# Patient Record
Sex: MALE | Race: WHITE | NOT HISPANIC OR LATINO | Employment: OTHER | URBAN - NONMETROPOLITAN AREA
[De-identification: names, ages, dates, MRNs, and addresses within clinical notes are randomized per-mention and may not be internally consistent; named-entity substitution may affect disease eponyms.]

---

## 2017-01-04 RX ORDER — CLONAZEPAM 0.5 MG/1
TABLET ORAL
Qty: 90 TABLET | Refills: 0
Start: 2017-01-04 | End: 2017-02-06 | Stop reason: SDUPTHER

## 2017-02-06 RX ORDER — CLONAZEPAM 0.5 MG/1
TABLET ORAL
Qty: 90 TABLET | Refills: 0
Start: 2017-02-06 | End: 2017-03-06 | Stop reason: SDUPTHER

## 2017-02-06 RX ORDER — RISPERIDONE 1 MG/1
TABLET ORAL
Qty: 30 TABLET | Refills: 2 | Status: SHIPPED | OUTPATIENT
Start: 2017-02-06 | End: 2017-05-09 | Stop reason: SDUPTHER

## 2017-02-06 RX ORDER — MIRTAZAPINE 30 MG/1
30 TABLET, FILM COATED ORAL NIGHTLY
Qty: 30 TABLET | Refills: 2 | Status: SHIPPED | OUTPATIENT
Start: 2017-02-06 | End: 2017-04-17 | Stop reason: SDUPTHER

## 2017-02-06 RX ORDER — DEXTROMETHORPHAN HYDROBROMIDE AND QUINIDINE SULFATE 20; 10 MG/1; MG/1
1 CAPSULE, GELATIN COATED ORAL EVERY 12 HOURS
Qty: 60 CAPSULE | Refills: 2 | Status: SHIPPED | OUTPATIENT
Start: 2017-02-06 | End: 2017-05-09 | Stop reason: SDUPTHER

## 2017-02-07 ENCOUNTER — OFFICE VISIT (OUTPATIENT)
Dept: PSYCHIATRY | Facility: CLINIC | Age: 37
End: 2017-02-07

## 2017-02-07 VITALS
HEIGHT: 67 IN | WEIGHT: 176 LBS | BODY MASS INDEX: 27.62 KG/M2 | HEART RATE: 69 BPM | SYSTOLIC BLOOD PRESSURE: 110 MMHG | DIASTOLIC BLOOD PRESSURE: 79 MMHG

## 2017-02-07 DIAGNOSIS — F48.2 PBA (PSEUDOBULBAR AFFECT): Primary | ICD-10-CM

## 2017-02-07 DIAGNOSIS — Z87.828 HISTORY OF TRAUMATIC HEAD INJURY: ICD-10-CM

## 2017-02-07 PROCEDURE — 99213 OFFICE O/P EST LOW 20 MIN: CPT | Performed by: NURSE PRACTITIONER

## 2017-02-07 RX ORDER — CETIRIZINE HYDROCHLORIDE 10 MG/1
10 TABLET ORAL DAILY
COMMUNITY
End: 2017-08-08 | Stop reason: SDUPTHER

## 2017-02-07 NOTE — PROGRESS NOTES
This provider is located at [facility], Behavioral Health, [address], [address] [place] [facility]"" in [facility].    The Patient  is seen remotely at [facility], [facility], [address] [place] [address], using [employer], an encrypted service from one [facility] facility to another,  without staff present.    The patient’s condition being diagnosed/treated is appropriate for telemedicine. The provider identified him/herself as well as his or her credentials.     The patient and/or patients guardian consent to be seen remotely, and when consent is given they understand that the consent allows for patient identifiable information to be sent to a third party as needed.   They may refuse to be seen remotely at any time.  The electronic data is encrypted and password protected, and the patient has been advised of the potential risks to privacy notwithstanding such measures.        Subjective   Murali Coyle is a 36 y.o. male who is here today for medication management follow up.    Chief Complaint: Diagnoses and all orders for this visit:    PBA (pseudobulbar affect)    History of traumatic head injury      History of Present Illness Staff member accompanies patient to appt. He reports no issues with patient. He is taking his medications, he is eating well, sleeping well. He stares at the TV, no aggression, no crying or laughing inappropriately while on his medications. Patient stares at Monitor screen making no effort to communicate until I asked him several times if he could sit up and then he yelled loudly for 2 seconds and it was over. When the staff member said ok lets go he got up and left. He held his mouth open the entire time. Little movement while in room, has a steady gait, no history of falling. No involuntary movements noted. Is continent of urine and bowels.       The following portions of the patient's history were reviewed and updated as appropriate: allergies, current medications, past  "family history, past medical history, past social history, past surgical history and problem list.    Review of Systemsdenies fever, cough, s/s’s of infection, denies GI/ problems, denies new medical issues     Objective   Physical Exam  Blood pressure 110/79, pulse 69, height 67\" (170.2 cm), weight 176 lb (79.8 kg).    No Known Allergies    Current Medications:   Current Outpatient Prescriptions   Medication Sig Dispense Refill   • cetirizine (zyrTEC) 10 MG tablet Take 10 mg by mouth Daily.     • clonazePAM (KLONOPIN) 0.5 MG tablet Take one tablet by mouth three times a day 90 tablet 0   • mirtazapine (REMERON) 30 MG tablet Take 1 tablet by mouth Every Night. 30 tablet 2   • NUEDEXTA 20-10 MG capsule capsule Take 1 capsule by mouth Every 12 (Twelve) Hours. 60 capsule 2   • risperiDONE (RISPERDAL) 1 MG tablet Take 1 tablet by mouth at 8pm 30 tablet 2     No current facility-administered medications for this visit.        Mental Status Exam:   Appearance: appropriate  Hygiene:   good  Cooperation:  Cooperative  Eye Contact:  Poor  Psychomotor Behavior:  Appropriate  Mood:  cooperative  Affect:  flat  Hopelessness: unable to determine   Speech:  Mute  Thought Process:  Unable to demonstrate  Thought Content:  Unable to demonstrate  Suicidal:  unable to assess no voicing this per staff  Homicidal:  unable to assess   Hallucinations:  Not demonstrated today  Delusion:  Unable to demonstrate  Memory:  Unable to evaluate  Orientation:  Unable to evaluate  Reliability:  staff  Insight:  staff  Judgement:  Impaired  Impulse Control:  Impaired  Estimated Intelligence: average range   Physical/Medical Issues:  No     Assessment/Plan   Diagnoses and all orders for this visit:    PBA (pseudobulbar affect)    History of traumatic head injury      Stable, continue current med management. All meds were refilled via escript and RXNT yesterday. Staff report stability at the residential home.   ·       We discussed risks, benefits, " and side effects of the above medications and the patient was agreeable with the plan. Patient was educated on the importance of compliance with treatment and follow-up appointments.   Controlled substance prescriptions are either  printed off for patient and staff, telephoned in  or ordered through RXNT by this provider  Instructed to call for questions or concerns and return early if necessary. Crisis plan reviewed including going to the Emergency department.    Return in about 3 months (around 5/7/2017) for Next scheduled follow up.

## 2017-03-07 RX ORDER — CLONAZEPAM 0.5 MG/1
TABLET ORAL
Qty: 90 TABLET | Refills: 0 | Status: CANCELLED
Start: 2017-03-07

## 2017-03-07 RX ORDER — CLONAZEPAM 0.5 MG/1
TABLET ORAL
Qty: 90 TABLET | Refills: 0
Start: 2017-03-07 | End: 2017-04-12 | Stop reason: SDUPTHER

## 2017-04-12 RX ORDER — CLONAZEPAM 0.5 MG/1
TABLET ORAL
Qty: 90 TABLET | Refills: 0
Start: 2017-04-12 | End: 2017-05-09 | Stop reason: SDUPTHER

## 2017-04-17 RX ORDER — MIRTAZAPINE 30 MG/1
30 TABLET, FILM COATED ORAL NIGHTLY
Qty: 30 TABLET | Refills: 2 | Status: SHIPPED | OUTPATIENT
Start: 2017-04-17 | End: 2017-05-09 | Stop reason: SDUPTHER

## 2017-05-09 ENCOUNTER — TELEMEDICINE (OUTPATIENT)
Dept: PSYCHIATRY | Facility: CLINIC | Age: 37
End: 2017-05-09

## 2017-05-09 DIAGNOSIS — Z87.828 HISTORY OF TRAUMATIC HEAD INJURY: ICD-10-CM

## 2017-05-09 DIAGNOSIS — F48.2 PBA (PSEUDOBULBAR AFFECT): Primary | ICD-10-CM

## 2017-05-09 PROCEDURE — 99213 OFFICE O/P EST LOW 20 MIN: CPT | Performed by: NURSE PRACTITIONER

## 2017-05-09 RX ORDER — MIRTAZAPINE 30 MG/1
30 TABLET, FILM COATED ORAL NIGHTLY
Qty: 30 TABLET | Refills: 2 | Status: SHIPPED | OUTPATIENT
Start: 2017-05-09 | End: 2017-08-08 | Stop reason: SDUPTHER

## 2017-05-09 RX ORDER — DEXTROMETHORPHAN HYDROBROMIDE AND QUINIDINE SULFATE 20; 10 MG/1; MG/1
1 CAPSULE, GELATIN COATED ORAL EVERY 12 HOURS
Qty: 60 CAPSULE | Refills: 2 | Status: SHIPPED | OUTPATIENT
Start: 2017-05-09 | End: 2017-08-08 | Stop reason: SDUPTHER

## 2017-05-09 RX ORDER — CLONAZEPAM 0.5 MG/1
TABLET ORAL
Qty: 90 TABLET | Refills: 0
Start: 2017-05-09 | End: 2017-06-14 | Stop reason: SDUPTHER

## 2017-05-09 RX ORDER — RISPERIDONE 1 MG/1
TABLET ORAL
Qty: 30 TABLET | Refills: 2 | Status: SHIPPED | OUTPATIENT
Start: 2017-05-09 | End: 2017-08-08 | Stop reason: SDUPTHER

## 2017-06-14 RX ORDER — CLONAZEPAM 0.5 MG/1
TABLET ORAL
Qty: 90 TABLET | Refills: 0
Start: 2017-06-14 | End: 2017-07-17 | Stop reason: SDUPTHER

## 2017-07-17 RX ORDER — CLONAZEPAM 0.5 MG/1
TABLET ORAL
Qty: 90 TABLET | Refills: 0
Start: 2017-07-17 | End: 2017-08-17 | Stop reason: SDUPTHER

## 2017-08-08 ENCOUNTER — TELEMEDICINE (OUTPATIENT)
Dept: PSYCHIATRY | Facility: CLINIC | Age: 37
End: 2017-08-08

## 2017-08-08 ENCOUNTER — APPOINTMENT (OUTPATIENT)
Dept: LAB | Facility: HOSPITAL | Age: 37
End: 2017-08-08

## 2017-08-08 VITALS
DIASTOLIC BLOOD PRESSURE: 66 MMHG | BODY MASS INDEX: 25.58 KG/M2 | WEIGHT: 163 LBS | HEART RATE: 65 BPM | SYSTOLIC BLOOD PRESSURE: 99 MMHG | HEIGHT: 67 IN

## 2017-08-08 DIAGNOSIS — Z79.899 MEDICATION MANAGEMENT: Primary | ICD-10-CM

## 2017-08-08 DIAGNOSIS — F48.2 PBA (PSEUDOBULBAR AFFECT): ICD-10-CM

## 2017-08-08 DIAGNOSIS — Z87.828 HISTORY OF TRAUMATIC HEAD INJURY: ICD-10-CM

## 2017-08-08 LAB
AMPHETAMINE CUT-OFF: NORMAL
BACTERIA UR QL AUTO: ABNORMAL /HPF
BENZODIAZIPINE CUT-OFF: NORMAL
BILIRUB UR QL STRIP: NEGATIVE
BUPRENORPHINE CUT-OFF: NORMAL
CLARITY UR: CLEAR
COCAINE CUT-OFF: NORMAL
COLOR UR: ABNORMAL
EXTERNAL AMPHETAMINE SCREEN URINE: NEGATIVE
EXTERNAL BENZODIAZEPINE SCREEN URINE: NEGATIVE
EXTERNAL BUPRENORPHINE SCREEN URINE: NEGATIVE
EXTERNAL COCAINE SCREEN URINE: NEGATIVE
EXTERNAL MDMA: NEGATIVE
EXTERNAL METHADONE SCREEN URINE: NEGATIVE
EXTERNAL METHAMPHETAMINE SCREEN URINE: NEGATIVE
EXTERNAL OPIATES SCREEN URINE: NEGATIVE
EXTERNAL OXYCODONE SCREEN URINE: NEGATIVE
EXTERNAL THC SCREEN URINE: NEGATIVE
GLUCOSE UR STRIP-MCNC: NEGATIVE MG/DL
HGB UR QL STRIP.AUTO: NEGATIVE
HYALINE CASTS UR QL AUTO: ABNORMAL /LPF
KETONES UR QL STRIP: NEGATIVE
LEUKOCYTE ESTERASE UR QL STRIP.AUTO: NEGATIVE
MDMA CUT-OFF: NORMAL
METHADONE CUT-OFF: NORMAL
METHAMPHETAMINE CUT-OFF: NORMAL
NITRITE UR QL STRIP: NEGATIVE
OPIATES CUT-OFF: NORMAL
OXYCODONE CUT-OFF: NORMAL
PH UR STRIP.AUTO: 6 [PH] (ref 5–8)
PROT UR QL STRIP: NEGATIVE
RBC # UR: ABNORMAL /HPF
REF LAB TEST METHOD: ABNORMAL
SP GR UR STRIP: 1.02 (ref 1–1.03)
SQUAMOUS #/AREA URNS HPF: ABNORMAL /HPF
THC CUT-OFF: NORMAL
UROBILINOGEN UR QL STRIP: ABNORMAL
WBC UR QL AUTO: ABNORMAL /HPF

## 2017-08-08 PROCEDURE — 81001 URINALYSIS AUTO W/SCOPE: CPT | Performed by: NURSE PRACTITIONER

## 2017-08-08 PROCEDURE — 81015 MICROSCOPIC EXAM OF URINE: CPT | Performed by: NURSE PRACTITIONER

## 2017-08-08 PROCEDURE — 99213 OFFICE O/P EST LOW 20 MIN: CPT | Performed by: NURSE PRACTITIONER

## 2017-08-08 RX ORDER — RISPERIDONE 1 MG/1
TABLET ORAL
Qty: 30 TABLET | Refills: 2 | Status: SHIPPED | OUTPATIENT
Start: 2017-08-08 | End: 2017-11-21 | Stop reason: SDUPTHER

## 2017-08-08 RX ORDER — DEXTROMETHORPHAN HYDROBROMIDE AND QUINIDINE SULFATE 20; 10 MG/1; MG/1
1 CAPSULE, GELATIN COATED ORAL EVERY 12 HOURS
Qty: 60 CAPSULE | Refills: 2 | Status: SHIPPED | OUTPATIENT
Start: 2017-08-08 | End: 2017-11-21 | Stop reason: SDUPTHER

## 2017-08-08 RX ORDER — MIRTAZAPINE 30 MG/1
30 TABLET, FILM COATED ORAL NIGHTLY
Qty: 30 TABLET | Refills: 2 | Status: SHIPPED | OUTPATIENT
Start: 2017-08-08 | End: 2017-11-06 | Stop reason: SDUPTHER

## 2017-08-08 RX ORDER — CETIRIZINE HYDROCHLORIDE 10 MG/1
TABLET ORAL
COMMUNITY
Start: 2016-02-23

## 2017-08-08 RX ORDER — LANOLIN/MINERAL OIL/PEG
OIL (ML) TOPICAL
Refills: 5 | COMMUNITY
Start: 2017-06-21
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
## 2017-08-09 ENCOUNTER — TELEPHONE (OUTPATIENT)
Dept: PSYCHIATRY | Facility: CLINIC | Age: 37
End: 2017-08-09

## 2017-08-09 NOTE — TELEPHONE ENCOUNTER
Spoke to Karely at [facility] regarding the UDS on Murali.  I told her the UDS was negative for [facility] and she thought they had reliable staff that administered the meds.  However, she is going to report to director.

## 2017-08-17 RX ORDER — CLONAZEPAM 0.5 MG/1
TABLET ORAL
Qty: 90 TABLET | Refills: 0
Start: 2017-08-17 | End: 2017-09-15 | Stop reason: SDUPTHER

## 2017-09-18 RX ORDER — CLONAZEPAM 0.5 MG/1
TABLET ORAL
Qty: 90 TABLET | Refills: 0
Start: 2017-09-18 | End: 2017-10-16 | Stop reason: SDUPTHER

## 2017-10-16 RX ORDER — CLONAZEPAM 0.5 MG/1
TABLET ORAL
Qty: 90 TABLET | Refills: 0
Start: 2017-10-16 | End: 2017-11-06 | Stop reason: SDUPTHER

## 2017-11-07 RX ORDER — MIRTAZAPINE 30 MG/1
30 TABLET, FILM COATED ORAL NIGHTLY
Qty: 30 TABLET | Refills: 2 | Status: SHIPPED | OUTPATIENT
Start: 2017-11-07 | End: 2017-12-05 | Stop reason: SDUPTHER

## 2017-11-07 RX ORDER — CLONAZEPAM 0.5 MG/1
TABLET ORAL
Qty: 90 TABLET | Refills: 0
Start: 2017-11-07 | End: 2017-12-05 | Stop reason: SDUPTHER

## 2017-11-21 RX ORDER — DEXTROMETHORPHAN HYDROBROMIDE AND QUINIDINE SULFATE 20; 10 MG/1; MG/1
1 CAPSULE, GELATIN COATED ORAL EVERY 12 HOURS
Qty: 60 CAPSULE | Refills: 2 | Status: SHIPPED | OUTPATIENT
Start: 2017-11-21 | End: 2017-12-05 | Stop reason: SDUPTHER

## 2017-11-21 RX ORDER — RISPERIDONE 1 MG/1
TABLET ORAL
Qty: 30 TABLET | Refills: 2 | Status: SHIPPED | OUTPATIENT
Start: 2017-11-21 | End: 2017-12-05 | Stop reason: SDUPTHER

## 2017-12-05 ENCOUNTER — TELEMEDICINE (OUTPATIENT)
Dept: PSYCHIATRY | Facility: CLINIC | Age: 37
End: 2017-12-05

## 2017-12-05 VITALS
HEIGHT: 67 IN | HEART RATE: 69 BPM | SYSTOLIC BLOOD PRESSURE: 107 MMHG | BODY MASS INDEX: 25.58 KG/M2 | DIASTOLIC BLOOD PRESSURE: 75 MMHG | WEIGHT: 163 LBS

## 2017-12-05 DIAGNOSIS — Z87.828 HISTORY OF TRAUMATIC HEAD INJURY: ICD-10-CM

## 2017-12-05 DIAGNOSIS — F48.2 PBA (PSEUDOBULBAR AFFECT): Primary | ICD-10-CM

## 2017-12-05 DIAGNOSIS — F79 ACQUIRED INTELLECTUAL DISABILITY: ICD-10-CM

## 2017-12-05 PROCEDURE — 99213 OFFICE O/P EST LOW 20 MIN: CPT | Performed by: NURSE PRACTITIONER

## 2017-12-05 RX ORDER — CLONAZEPAM 0.5 MG/1
TABLET ORAL
Qty: 90 TABLET | Refills: 0
Start: 2017-12-05 | End: 2017-12-28 | Stop reason: SDUPTHER

## 2017-12-05 RX ORDER — MIRTAZAPINE 30 MG/1
30 TABLET, FILM COATED ORAL NIGHTLY
Qty: 30 TABLET | Refills: 2 | Status: SHIPPED | OUTPATIENT
Start: 2017-12-05 | End: 2018-02-07 | Stop reason: SDUPTHER

## 2017-12-05 RX ORDER — RISPERIDONE 1 MG/1
TABLET ORAL
Qty: 30 TABLET | Refills: 2 | Status: SHIPPED | OUTPATIENT
Start: 2017-12-05 | End: 2018-02-07 | Stop reason: SDUPTHER

## 2017-12-05 NOTE — PROGRESS NOTES
This provider is located at [facility], Behavioral Health, [address], [place] [address]  The Patient  is seen remotely at [facility], [facility], [address] [place] [address], using [employer], an encrypted service from one [facility] to another,  without staff present.    The patient’s condition being diagnosed/treated is appropriate for telemedicine. The provider identified him/herself as well as his or her credentials.     The patient and/or patients guardian consent to be seen remotely, and when consent is given they understand that the consent allows for patient identifiable information to be sent to a third party as needed.   They may refuse to be seen remotely at any time.  The electronic data is encrypted and password protected, and the patient has been advised of the potential risks to privacy notwithstanding such measures.        Subjective   Murali Coyle is a 37 y.o. male who is here today for medication management follow up. Came in from [facility].     Chief Complaint:  Diagnoses and all orders for this visit:    PBA (pseudobulbar affect)    History of traumatic head injury    Acquired intellectual disability      History of Present Illness Patient arrives with staff from [facility]. Jazmin reports pt is doing well and just needs clonazepam refilled. He is sleeping and eating, however he has had a 10lb weight loss since last seen 3 months ago. Medical director aware, to be evaluated by his PCP. No outbursts, no crying or inappropriate laughter or outbursts. No aggression or self harm. Awake during day per Jazmin. He follows instructions.     (Scales based on 0 - 10 with 10 being the worst)        The following portions of the patient's history were reviewed and updated as appropriate: allergies, current medications, past family history, past medical history, past social history, past surgical history and problem list.    Review of Systemsdenies  "fever, cough, s/s’s of infection, denies GI/ problems, denies new medical issues     Objective   Physical Exam  Blood pressure 107/75, pulse 69, height 170 cm (66.93\"), weight 73.9 kg (163 lb). weight loss, discuss with staff     No Known Allergies    Current Medications:   Current Outpatient Prescriptions   Medication Sig Dispense Refill   • cetirizine (zyrTEC) 10 MG tablet Take  by mouth.     • clonazePAM (KLONOPIN) 0.5 MG tablet Take one tablet by mouth three times a day 90 tablet 0   • dextromethorphan-quinidine (NUEDEXTA) 20-10 MG capsule capsule Take 1 capsule by mouth Every 12 (Twelve) Hours 60 capsule 2   • mirtazapine (REMERON) 30 MG tablet Take 1 tablet (30 mg total) by mouth Every Night 30 tablet 2   • risperiDONE (RISPERDAL) 1 MG tablet Take 1 tablet by mouth at 8pm 30 tablet 2   • Skin Oils (ALPHA ALTA SHOWER & BATH) oil   5     No current facility-administered medications for this visit.        Mental Status Exam:   Appearance: appropriate  Hygiene:   good  Cooperation:  Cooperative  Eye Contact:  Poor  Psychomotor Behavior:  Slow  Mood:  Always flat mute, non responsive to provider, follows simple commands  Affect:  flat  Hopelessness: unable to assess   Speech:  Mute  Thought Process:  Unable to demonstrate  Thought Content:  Unable to demonstrate  Suicidal:  unable to assess  Homicidal:  unable to assess  Hallucinations:  Not demonstrated today  Delusion:  Unable to demonstrate  Memory:  Deficits  Orientation:  Unable to evaluate  Reliability:  mute  Insight:  unable to assess  Judgement:  Poor  Impulse Control:  Impaired   Physical/Medical Issues:  Yes weight loss, medical staff to evaluate     Assessment/Plan   Diagnoses and all orders for this visit:    PBA (pseudobulbar affect)    History of traumatic head injury    Acquired intellectual disability    Other orders  -     clonazePAM (KLONOPIN) 0.5 MG tablet; Take one tablet by mouth three times a day  -     risperiDONE (RISPERDAL) 1 MG tablet; " Take 1 tablet by mouth at 8pm  -     dextromethorphan-quinidine (NUEDEXTA) 20-10 MG capsule capsule; Take 1 capsule by mouth Every 12 (Twelve) Hours  -     mirtazapine (REMERON) 30 MG tablet; Take 1 tablet (30 mg total) by mouth Every Night      Stable mood  Will get lab work fax'd by [facility] that has been done in last month and review  If doesn't happen will get lab work next visit  Asked Jazmin to monitor food intake, making sure he is eating his meals    We discussed risks, benefits, and side effects of the above medications and the patient was agreeable with the plan. Patient was educated on the importance of compliance with treatment and follow-up appointments.   Controlled substance prescriptions are either  printed off for patient, telephoned in  or ordered through RXNT by this provider  Instructed to call for questions or concerns and return early if necessary. Crisis plan reviewed including going to the Emergency department.    Return in about 3 months (around 3/5/2018).

## 2017-12-28 RX ORDER — CLONAZEPAM 0.5 MG/1
TABLET ORAL
Qty: 45 TABLET | Refills: 0 | Status: SHIPPED | OUTPATIENT
Start: 2017-12-28 | End: 2018-01-22 | Stop reason: SDUPTHER

## 2017-12-28 RX ORDER — CLONAZEPAM 0.5 MG/1
TABLET ORAL
Qty: 90 TABLET | Refills: 0
Start: 2017-12-28 | End: 2017-12-28 | Stop reason: SDUPTHER

## 2017-12-28 NOTE — TELEPHONE ENCOUNTER
Patient needs scheduled earlier with [facility]-discuss/repeat uds will rx only 45 until appointment is made

## 2018-01-23 RX ORDER — CLONAZEPAM 0.5 MG/1
TABLET ORAL
Qty: 45 TABLET | Refills: 0 | Status: SHIPPED | OUTPATIENT
Start: 2018-01-23 | End: 2018-01-23

## 2018-01-23 RX ORDER — CLONAZEPAM 0.5 MG/1
TABLET ORAL
Qty: 45 TABLET | Refills: 0 | Status: CANCELLED | OUTPATIENT
Start: 2018-01-23

## 2018-01-24 RX ORDER — CLONAZEPAM 0.5 MG/1
TABLET ORAL
Qty: 90 TABLET | Refills: 0
Start: 2018-01-24 | End: 2018-02-07 | Stop reason: SDUPTHER

## 2018-02-07 ENCOUNTER — OFFICE VISIT (OUTPATIENT)
Dept: PSYCHIATRY | Facility: CLINIC | Age: 38
End: 2018-02-07

## 2018-02-07 VITALS — HEIGHT: 67 IN

## 2018-02-07 DIAGNOSIS — F48.2 PBA (PSEUDOBULBAR AFFECT): Primary | ICD-10-CM

## 2018-02-07 DIAGNOSIS — F79 ACQUIRED INTELLECTUAL DISABILITY: ICD-10-CM

## 2018-02-07 DIAGNOSIS — Z87.828 HISTORY OF TRAUMATIC HEAD INJURY: ICD-10-CM

## 2018-02-07 PROCEDURE — 99214 OFFICE O/P EST MOD 30 MIN: CPT | Performed by: NURSE PRACTITIONER

## 2018-02-07 RX ORDER — RISPERIDONE 2 MG/1
TABLET ORAL
Qty: 30 TABLET | Refills: 2 | Status: SHIPPED | OUTPATIENT
Start: 2018-02-07 | End: 2018-04-24 | Stop reason: SDUPTHER

## 2018-02-07 RX ORDER — MIRTAZAPINE 30 MG/1
30 TABLET, FILM COATED ORAL NIGHTLY
Qty: 30 TABLET | Refills: 2 | Status: SHIPPED | OUTPATIENT
Start: 2018-02-07 | End: 2018-05-08 | Stop reason: SDUPTHER

## 2018-02-07 RX ORDER — CLONAZEPAM 0.5 MG/1
TABLET ORAL
Qty: 90 TABLET | Refills: 0 | Status: SHIPPED | OUTPATIENT
Start: 2018-02-07 | End: 2018-03-13 | Stop reason: SDUPTHER

## 2018-03-13 DIAGNOSIS — F48.2 PBA (PSEUDOBULBAR AFFECT): ICD-10-CM

## 2018-03-13 RX ORDER — CLONAZEPAM 0.5 MG/1
TABLET ORAL
Qty: 90 TABLET | Refills: 0 | Status: SHIPPED | OUTPATIENT
Start: 2018-03-13 | End: 2018-04-24 | Stop reason: SDUPTHER

## 2018-04-24 RX ORDER — CLONAZEPAM 0.5 MG/1
TABLET ORAL
Qty: 90 TABLET | Refills: 0 | Status: SHIPPED | OUTPATIENT
Start: 2018-04-24 | End: 2018-05-17 | Stop reason: SDUPTHER

## 2018-04-25 RX ORDER — RISPERIDONE 2 MG/1
TABLET ORAL
Qty: 30 TABLET | Refills: 2 | Status: SHIPPED | OUTPATIENT
Start: 2018-04-25 | End: 2018-05-22 | Stop reason: SDUPTHER

## 2018-05-08 RX ORDER — MIRTAZAPINE 30 MG/1
30 TABLET, FILM COATED ORAL NIGHTLY
Qty: 30 TABLET | Refills: 2 | Status: SHIPPED | OUTPATIENT
Start: 2018-05-08 | End: 2018-05-22 | Stop reason: SDUPTHER

## 2018-05-17 RX ORDER — CLONAZEPAM 0.5 MG/1
TABLET ORAL
Qty: 90 TABLET | Refills: 0 | Status: SHIPPED | OUTPATIENT
Start: 2018-05-17 | End: 2018-05-22 | Stop reason: SDUPTHER

## 2018-05-22 ENCOUNTER — OFFICE VISIT (OUTPATIENT)
Dept: PSYCHIATRY | Facility: CLINIC | Age: 38
End: 2018-05-22

## 2018-05-22 DIAGNOSIS — F79 ACQUIRED INTELLECTUAL DISABILITY: ICD-10-CM

## 2018-05-22 DIAGNOSIS — Z87.828 HISTORY OF TRAUMATIC HEAD INJURY: ICD-10-CM

## 2018-05-22 DIAGNOSIS — F48.2 PBA (PSEUDOBULBAR AFFECT): Primary | ICD-10-CM

## 2018-05-22 PROCEDURE — 99214 OFFICE O/P EST MOD 30 MIN: CPT | Performed by: NURSE PRACTITIONER

## 2018-05-22 RX ORDER — CLONAZEPAM 0.5 MG/1
TABLET ORAL
Qty: 90 TABLET | Refills: 0
Start: 2018-05-22 | End: 2018-06-11 | Stop reason: SDUPTHER

## 2018-05-22 RX ORDER — RISPERIDONE 2 MG/1
TABLET ORAL
Qty: 30 TABLET | Refills: 2
Start: 2018-05-22 | End: 2018-07-31 | Stop reason: SDUPTHER

## 2018-05-22 RX ORDER — MIRTAZAPINE 30 MG/1
30 TABLET, FILM COATED ORAL NIGHTLY
Qty: 30 TABLET | Refills: 2
Start: 2018-05-22 | End: 2018-07-31 | Stop reason: SDUPTHER

## 2018-05-22 NOTE — PROGRESS NOTES
"  Subjective   Murali Coyle is a 37 y.o. male is here today for medication management follow-up at the [facility], he presents to his appointment on time.  He presents with a caregiver from [facility].      Chief Complaint: Follow-up    History of Present Illness  Patient is unable to communicate with provider but is able to briefly say different words; he noted to say \"hey\" several times in the waiting room and coming to the office.  Caregiver stated that he tends to do that to scare people.  When asked if this was what he was doing he noted to smile.  No reports were given related to acting out episodes, reports were noted that he had been sleeping and eating well.  He enjoys eating pizza and watching [employer]  He has been attending the Day Program at his group home, has been encouraged by others to say inappropriate things at times.  Murali said he was \"naughty\" and an \"awnery butt\".  No mood problems were reported.  No new health issues were reported.  No acting out or self harming behaviors reported.  No evidence noted of responding to internal or external stimuli.  Noted to have incontinence while at his appointment.      The following portions of the patient's history were reviewed and updated as appropriate: allergies, current medications, past family history, past medical history, past social history, past surgical history and problem list.    Review of Systems   Constitutional: Negative for appetite change, chills, diaphoresis, fatigue, fever and unexpected weight change.   HENT: Negative for hearing loss, sore throat, trouble swallowing and voice change.    Eyes: Negative for photophobia and visual disturbance.   Respiratory: Negative for cough, chest tightness and shortness of breath.    Cardiovascular: Negative for chest pain and palpitations.   Gastrointestinal: Negative for abdominal pain, constipation, nausea and vomiting.   Endocrine: Negative for cold intolerance " and heat intolerance.   Genitourinary: Negative for dysuria and frequency.        Incontinence    Musculoskeletal: Negative for arthralgias, back pain, joint swelling and neck stiffness.   Skin: Negative for color change and wound.   Allergic/Immunologic: Negative for environmental allergies and immunocompromised state.   Neurological: Negative for dizziness, tremors, seizures, syncope, weakness, light-headedness and headaches.   Hematological: Negative for adenopathy. Does not bruise/bleed easily.       Objective   Physical Exam   Constitutional: He appears well-nourished.   Neurological:   Unable to communicate      There were no vitals taken for this visit.  Refused     Medication List:   Current Outpatient Prescriptions   Medication Sig Dispense Refill   • cetirizine (zyrTEC) 10 MG tablet Take  by mouth.     • clonazePAM (KLONOPIN) 0.5 MG tablet Take one tablet by mouth three times a day 90 tablet 0   • dextromethorphan-quinidine (NUEDEXTA) 20-10 MG capsule capsule Take 1 capsule by mouth Every 12 (Twelve) Hours. 60 capsule 2   • mirtazapine (REMERON) 30 MG tablet Take 1 tablet by mouth Every Night. 30 tablet 2   • risperiDONE (risperDAL) 2 MG tablet Take 1 tablet by mouth at 8pm 30 tablet 2   • Skin Oils (ALPHA ALTA SHOWER & BATH) oil   5     No current facility-administered medications for this visit.        Mental Status Exam:   Hygiene:   fair  Cooperation:  Guarded  Eye Contact:  Fair  Psychomotor Behavior:  Aggitated  Affect:  Incongruent  Hopelessness: Denies  Speech:  Limited  Thought Process:  blocked  Thought Content:  Unable to demonstrate  Suicidal:  No self injurous behavior reported  Homicidal:  No violent behavior toward others reported  Hallucinations:  No response noted to internal or external stimuli  Delusion:  Other Unable to assess  Memory:  Unable to evaluate  Orientation:  Person  Reliability:  poor  Insight:  None  Judgement:  Impaired  Impulse Control:  Impaired  Physical/Medical Issues:   No     Assessment/Plan   Problems Addressed this Visit     None      Visit Diagnoses     PBA (pseudobulbar affect)    -  Primary    History of traumatic head injury        Acquired intellectual disability        Relevant Medications    mirtazapine (REMERON) 30 MG tablet    risperiDONE (risperDAL) 2 MG tablet        Clonazepam 0.5mg tid prn for anxiety    Discussed medication options. Continue mirtazapine for sleep; risperidone for impulse control, and clonazepam for anxiety.  Reviewed the risks, benefits, and side effects of the medications; patient acknowledged and verbally consented.  Patient is agreeable to call the [facility].  Patient is aware to call 911 or go to the nearest ER should begin having SI/HI.     Prognosis:  Guarded dependent on medication, follow up appointment and treatment plan compliance     Functionality: Poor.  Patient unable to communicate and requires assistance to complete ADLs.      Return in 12 weeks

## 2018-06-11 DIAGNOSIS — F48.2 PBA (PSEUDOBULBAR AFFECT): ICD-10-CM

## 2018-06-11 RX ORDER — CLONAZEPAM 0.5 MG/1
TABLET ORAL
Qty: 90 TABLET | Refills: 0
Start: 2018-06-11 | End: 2018-07-23 | Stop reason: SDUPTHER

## 2018-06-25 ENCOUNTER — TELEPHONE (OUTPATIENT)
Dept: PSYCHIATRY | Facility: CLINIC | Age: 38
End: 2018-06-25

## 2018-07-23 RX ORDER — CLONAZEPAM 0.5 MG/1
TABLET ORAL
Qty: 90 TABLET | Refills: 0
Start: 2018-07-23 | End: 2018-07-31 | Stop reason: SDUPTHER

## 2018-07-25 ENCOUNTER — TELEPHONE (OUTPATIENT)
Dept: PSYCHIATRY | Facility: CLINIC | Age: 38
End: 2018-07-25

## 2018-07-25 NOTE — TELEPHONE ENCOUNTER
Pharmacy did not receive his script on Risperdal 2 Mg I verbally gave permission to fill script with 2 refills take 1 tab by mouth at 8 PM.

## 2018-07-31 ENCOUNTER — OFFICE VISIT (OUTPATIENT)
Dept: PSYCHIATRY | Facility: CLINIC | Age: 38
End: 2018-07-31

## 2018-07-31 VITALS
SYSTOLIC BLOOD PRESSURE: 118 MMHG | DIASTOLIC BLOOD PRESSURE: 86 MMHG | HEIGHT: 67 IN | WEIGHT: 173 LBS | BODY MASS INDEX: 27.15 KG/M2 | HEART RATE: 77 BPM

## 2018-07-31 DIAGNOSIS — Z87.828 HISTORY OF TRAUMATIC HEAD INJURY: ICD-10-CM

## 2018-07-31 DIAGNOSIS — F48.2 PBA (PSEUDOBULBAR AFFECT): Primary | ICD-10-CM

## 2018-07-31 DIAGNOSIS — F79 ACQUIRED INTELLECTUAL DISABILITY: ICD-10-CM

## 2018-07-31 PROCEDURE — 99214 OFFICE O/P EST MOD 30 MIN: CPT | Performed by: NURSE PRACTITIONER

## 2018-07-31 RX ORDER — MIRTAZAPINE 30 MG/1
30 TABLET, FILM COATED ORAL NIGHTLY
Qty: 30 TABLET | Refills: 2 | Status: SHIPPED | OUTPATIENT
Start: 2018-07-31 | End: 2018-09-05 | Stop reason: SDUPTHER

## 2018-07-31 RX ORDER — CLONAZEPAM 0.5 MG/1
TABLET ORAL
Qty: 90 TABLET | Refills: 0 | Status: SHIPPED | OUTPATIENT
Start: 2018-07-31 | End: 2018-08-28 | Stop reason: SDUPTHER

## 2018-07-31 RX ORDER — MIRTAZAPINE 30 MG/1
30 TABLET, FILM COATED ORAL NIGHTLY
Qty: 30 TABLET | Refills: 2 | Status: SHIPPED | OUTPATIENT
Start: 2018-07-31 | End: 2018-07-31 | Stop reason: SDUPTHER

## 2018-07-31 RX ORDER — RISPERIDONE 3 MG/1
TABLET ORAL
Qty: 30 TABLET | Refills: 2 | Status: SHIPPED | OUTPATIENT
Start: 2018-07-31 | End: 2018-07-31 | Stop reason: SDUPTHER

## 2018-07-31 RX ORDER — RISPERIDONE 3 MG/1
TABLET ORAL
Qty: 30 TABLET | Refills: 2 | Status: SHIPPED | OUTPATIENT
Start: 2018-07-31 | End: 2018-09-25 | Stop reason: SDUPTHER

## 2018-07-31 RX ORDER — CLONAZEPAM 0.5 MG/1
TABLET ORAL
Qty: 90 TABLET | Refills: 0 | Status: SHIPPED | OUTPATIENT
Start: 2018-07-31 | End: 2018-07-31 | Stop reason: SDUPTHER

## 2018-07-31 NOTE — PROGRESS NOTES
"  Subjective   Murali Coyle is a 38 y.o. male is here today for medication management follow-up at the [facility], he presents to his appointment on time.  He presents with a caregiver from [facility].      Chief Complaint: Follow-up    History of Present Illness  Patient is unable to communicate with provider. He has previously yelled out \"hey\" during his visits to scare people but this is not present at this appointment.  No reports were given related to acting out episodes, reports were noted that he had been sleeping and eating well.  He enjoys eating pizza and watching [employer]- [employer].  He has been attending the Day Program at his group home, no altercations with other residents.  Reported increased episodes of agitation while at home and refuses to listen to requests made by staff, increased verbal tics at [facility]T.   Noted to have boot on his left ankle from a recent injury by falling. No acting out or self harming behaviors reported.  No evidence noted of responding to internal or external stimuli.       The following portions of the patient's history were reviewed and updated as appropriate: allergies, current medications, past family history, past medical history, past social history, past surgical history and problem list.    Review of Systems   Constitutional: Negative for appetite change, chills, diaphoresis, fatigue, fever and unexpected weight change.   HENT: Negative for hearing loss, sore throat, trouble swallowing and voice change.    Eyes: Negative for photophobia and visual disturbance.   Respiratory: Negative for cough, chest tightness and shortness of breath.    Cardiovascular: Negative for chest pain and palpitations.   Gastrointestinal: Negative for abdominal pain, constipation, nausea and vomiting.   Endocrine: Negative for cold intolerance and heat intolerance.   Genitourinary: Negative for dysuria and frequency.   Musculoskeletal: Positive for gait problem. Negative for " "arthralgias, back pain, joint swelling and neck stiffness.        Boot on left ankle   Skin: Negative for color change and wound.   Allergic/Immunologic: Negative for environmental allergies and immunocompromised state.   Neurological: Negative for dizziness, tremors, seizures, syncope, weakness, light-headedness and headaches.   Hematological: Negative for adenopathy. Does not bruise/bleed easily.       Objective   Physical Exam   Constitutional: He appears well-nourished.   Neurological:   Unable to communicate      Blood pressure 118/86, pulse 77, height 170.2 cm (67.01\"), weight 78.5 kg (173 lb).  Refused     Medication List:   Current Outpatient Prescriptions   Medication Sig Dispense Refill   • cetirizine (zyrTEC) 10 MG tablet Take  by mouth.     • clonazePAM (KLONOPIN) 0.5 MG tablet Take one tablet by mouth three times a day 90 tablet 0   • dextromethorphan-quinidine (NUEDEXTA) 20-10 MG capsule capsule Take 1 capsule by mouth Every 12 (Twelve) Hours. 60 capsule 2   • mirtazapine (REMERON) 30 MG tablet Take 1 tablet by mouth Every Night. 30 tablet 2   • risperiDONE (risperDAL) 3 MG tablet Take 1 tablet by mouth at 8pm 30 tablet 2   • Skin Oils (ALPHA ALTA SHOWER & BATH) oil   5     No current facility-administered medications for this visit.        Mental Status Exam:   Hygiene:   fair  Cooperation:  Guarded  Eye Contact:  Fair  Psychomotor Behavior:  Aggitated  Affect:  Incongruent  Hopelessness: Denies  Speech:  Limited  Thought Process:  blocked  Thought Content:  Unable to demonstrate  Suicidal:  No self injurous behavior reported  Homicidal:  No violent behavior toward others reported  Hallucinations:  No response noted to internal or external stimuli  Delusion:  Other Unable to assess  Memory:  Unable to evaluate  Orientation:  Person  Reliability:  poor  Insight:  None  Judgement:  Impaired  Impulse Control:  Impaired  Physical/Medical Issues:  No     Assessment/Plan   Problems Addressed this Visit     " None      Visit Diagnoses     PBA (pseudobulbar affect)    -  Primary    History of traumatic head injury        Acquired intellectual disability        Relevant Medications    mirtazapine (REMERON) 30 MG tablet    risperiDONE (risperDAL) 3 MG tablet        Clonazepam 0.5mg tid prn for anxiety    Discussed medication options. Continue mirtazapine for sleep;  Increase risperidone for impulse control, and clonazepam for anxiety.  Reviewed the risks, benefits, and side effects of the medications; patient acknowledged and verbally consented.  Patient is agreeable to [facility].  Patient is aware to call 911 or go to the nearest ER should begin having SI/HI.     Prognosis:  Guarded dependent on medication, follow up appointment and treatment plan compliance     Functionality: Poor.  Patient unable to communicate and requires assistance to complete ADLs.      Return in 16 weeks

## 2018-08-28 RX ORDER — CLONAZEPAM 0.5 MG/1
TABLET ORAL
Qty: 90 TABLET | Refills: 0 | Status: SHIPPED | OUTPATIENT
Start: 2018-08-28 | End: 2018-08-28 | Stop reason: SDUPTHER

## 2018-08-28 RX ORDER — CLONAZEPAM 0.5 MG/1
TABLET ORAL
Qty: 90 TABLET | Refills: 0 | Status: SHIPPED | OUTPATIENT
Start: 2018-08-28 | End: 2018-09-17 | Stop reason: SDUPTHER

## 2018-09-05 RX ORDER — MIRTAZAPINE 30 MG/1
30 TABLET, FILM COATED ORAL NIGHTLY
Qty: 30 TABLET | Refills: 2 | Status: SHIPPED | OUTPATIENT
Start: 2018-09-05 | End: 2018-09-25 | Stop reason: SDUPTHER

## 2018-09-05 RX ORDER — MIRTAZAPINE 30 MG/1
30 TABLET, FILM COATED ORAL NIGHTLY
Qty: 30 TABLET | Refills: 2 | Status: SHIPPED | OUTPATIENT
Start: 2018-09-05 | End: 2018-09-05 | Stop reason: SDUPTHER

## 2018-09-17 DIAGNOSIS — F48.2 PBA (PSEUDOBULBAR AFFECT): ICD-10-CM

## 2018-09-18 RX ORDER — CLONAZEPAM 0.5 MG/1
TABLET ORAL
Qty: 90 TABLET | Refills: 0 | Status: SHIPPED | OUTPATIENT
Start: 2018-09-18 | End: 2018-09-25 | Stop reason: SDUPTHER

## 2018-09-25 ENCOUNTER — OFFICE VISIT (OUTPATIENT)
Dept: PSYCHIATRY | Facility: CLINIC | Age: 38
End: 2018-09-25

## 2018-09-25 VITALS
BODY MASS INDEX: 27.62 KG/M2 | SYSTOLIC BLOOD PRESSURE: 115 MMHG | DIASTOLIC BLOOD PRESSURE: 77 MMHG | WEIGHT: 176 LBS | HEART RATE: 72 BPM | HEIGHT: 67 IN

## 2018-09-25 DIAGNOSIS — Z87.828 HISTORY OF TRAUMATIC HEAD INJURY: ICD-10-CM

## 2018-09-25 DIAGNOSIS — F79 ACQUIRED INTELLECTUAL DISABILITY: ICD-10-CM

## 2018-09-25 DIAGNOSIS — F48.2 PBA (PSEUDOBULBAR AFFECT): Primary | ICD-10-CM

## 2018-09-25 PROCEDURE — 99213 OFFICE O/P EST LOW 20 MIN: CPT | Performed by: NURSE PRACTITIONER

## 2018-09-25 RX ORDER — CLONAZEPAM 0.5 MG/1
TABLET ORAL
Qty: 90 TABLET | Refills: 0 | Status: SHIPPED | OUTPATIENT
Start: 2018-09-25 | End: 2018-11-20 | Stop reason: SDUPTHER

## 2018-09-25 RX ORDER — MIRTAZAPINE 30 MG/1
30 TABLET, FILM COATED ORAL NIGHTLY
Qty: 30 TABLET | Refills: 5 | Status: SHIPPED | OUTPATIENT
Start: 2018-09-25 | End: 2019-03-04 | Stop reason: SDUPTHER

## 2018-09-25 RX ORDER — RISPERIDONE 3 MG/1
TABLET ORAL
Qty: 30 TABLET | Refills: 5 | Status: SHIPPED | OUTPATIENT
Start: 2018-09-25 | End: 2019-03-05 | Stop reason: SDUPTHER

## 2018-09-25 NOTE — PROGRESS NOTES
"  Subjective   Murali Coyle is a 38 y.o. male is here today for medication management follow-up at the [facility], he presents to his appointment on time.  He presents with a caregiver from [facility].      Chief Complaint: Follow-up for agitation    History of Present Illness  Patient is unable to communicate with provider. He has yelled out \"hey\" during this visit once while out in the lobby to scare people but not during his appointment.  No reports were given related to acting out episodes, reports were noted that he had been sleeping and eating well.  He enjoys eating pizza and watching [employer]- [employer].  He has been attending the Day Program at his group home, no altercations with other residents.  Reported recent stomach bug but no other health issues.  No reported stressors.  No acting out or self harming behaviors reported.  No evidence noted of responding to internal or external stimuli.       The following portions of the patient's history were reviewed and updated as appropriate: allergies, current medications, past family history, past medical history, past social history, past surgical history and problem list.    Review of Systems   Constitutional: Negative for appetite change, chills, diaphoresis, fatigue, fever and unexpected weight change.   HENT: Negative for hearing loss, sore throat, trouble swallowing and voice change.    Eyes: Negative for photophobia and visual disturbance.   Respiratory: Negative for cough, chest tightness and shortness of breath.    Cardiovascular: Negative for chest pain and palpitations.   Gastrointestinal: Negative for abdominal pain, constipation, nausea and vomiting.   Endocrine: Negative for cold intolerance and heat intolerance.   Genitourinary: Negative for dysuria and frequency.   Musculoskeletal: Negative for arthralgias, back pain, joint swelling and neck stiffness.   Skin: Negative for color change and wound.   Allergic/Immunologic: " "Negative for environmental allergies and immunocompromised state.   Neurological: Negative for dizziness, tremors, seizures, syncope, weakness, light-headedness and headaches.   Hematological: Negative for adenopathy. Does not bruise/bleed easily.       Objective   Physical Exam   Constitutional: He appears well-nourished.   Neurological:   Unable to communicate      Blood pressure 115/77, pulse 72, height 170.2 cm (67.01\"), weight 79.8 kg (176 lb).  Refused     Medication List:   Current Outpatient Prescriptions   Medication Sig Dispense Refill   • cetirizine (zyrTEC) 10 MG tablet Take  by mouth.     • clonazePAM (KLONOPIN) 0.5 MG tablet Take one tablet by mouth three times a day 90 tablet 0   • dextromethorphan-quinidine (NUEDEXTA) 20-10 MG capsule capsule Take 1 capsule by mouth Every 12 (Twelve) Hours. 60 capsule 2   • mirtazapine (REMERON) 30 MG tablet Take 1 tablet by mouth Every Night. 30 tablet 5   • risperiDONE (risperDAL) 3 MG tablet Take 1 tablet by mouth at 8pm 30 tablet 5   • Skin Oils (ALPHA ALTA SHOWER & BATH) oil   5     No current facility-administered medications for this visit.        Mental Status Exam:   Hygiene:   fair  Cooperation:  Guarded  Eye Contact:  Fair  Psychomotor Behavior:  Aggitated  Affect:  Incongruent  Hopelessness: Denies  Speech:  Limited  Thought Process:  blocked  Thought Content:  Unable to demonstrate  Suicidal:  No self injurous behavior reported  Homicidal:  No violent behavior toward others reported  Hallucinations:  No response noted to internal or external stimuli  Delusion:  Other Unable to assess  Memory:  Unable to evaluate  Orientation:  Person  Reliability:  poor  Insight:  None  Judgement:  Impaired  Impulse Control:  Impaired  Physical/Medical Issues:  No     Assessment/Plan   Problems Addressed this Visit     None      Visit Diagnoses     PBA (pseudobulbar affect)    -  Primary    History of traumatic head injury        Acquired intellectual disability        " Relevant Medications    mirtazapine (REMERON) 30 MG tablet    risperiDONE (risperDAL) 3 MG tablet        Clonazepam 0.5mg tid prn for anxiety    Discussed medication options. Continue mirtazapine for sleep;  Increase risperidone for impulse control, and clonazepam for anxiety.  Reviewed the risks, benefits, and side effects of the medications; patient acknowledged and verbally consented.  Patient is agreeable to call the [facility].  Patient is aware to call 911 or go to the nearest ER should begin having SI/HI.     Prognosis:  Guarded dependent on medication, follow up appointment and treatment plan compliance     Functionality: Poor.  Patient unable to communicate and requires assistance to complete ADLs.      Return in 24 weeks

## 2018-11-20 RX ORDER — CLONAZEPAM 0.5 MG/1
TABLET ORAL
Qty: 90 TABLET | Refills: 0 | Status: SHIPPED | OUTPATIENT
Start: 2018-11-20 | End: 2018-11-20 | Stop reason: SDUPTHER

## 2018-11-20 RX ORDER — CLONAZEPAM 0.5 MG/1
TABLET ORAL
Qty: 90 TABLET | Refills: 0 | Status: SHIPPED | OUTPATIENT
Start: 2018-11-20 | End: 2018-12-05 | Stop reason: SDUPTHER

## 2018-12-05 DIAGNOSIS — F48.2 PBA (PSEUDOBULBAR AFFECT): ICD-10-CM

## 2018-12-05 RX ORDER — CLONAZEPAM 0.5 MG/1
TABLET ORAL
Qty: 90 TABLET | Refills: 0 | Status: SHIPPED | OUTPATIENT
Start: 2018-12-05 | End: 2019-01-10 | Stop reason: SDUPTHER

## 2018-12-17 NOTE — PROGRESS NOTES
"      Subjective   Murali Coyle is a 37 y.o. male is here today for medication management follow-up.    Chief Complaint: Follow up    History of Present Illness    The following portions of the patient's history were reviewed and updated as appropriate: allergies, current medications, past family history, past medical history, past social history, past surgical history and problem list.    Review of Systems   Constitutional: Negative for appetite change, chills, diaphoresis, fatigue, fever and unexpected weight change.   HENT: Negative for hearing loss, sore throat, trouble swallowing and voice change.    Eyes: Negative for photophobia and visual disturbance.   Respiratory: Negative for cough, chest tightness and shortness of breath.    Cardiovascular: Negative for chest pain and palpitations.   Gastrointestinal: Negative for abdominal pain, constipation, nausea and vomiting.   Endocrine: Negative for cold intolerance and heat intolerance.   Genitourinary: Negative for dysuria and frequency.   Musculoskeletal: Negative for arthralgias, back pain, joint swelling and neck stiffness.   Skin: Negative for color change and wound.   Allergic/Immunologic: Negative for environmental allergies and immunocompromised state.   Neurological: Negative for dizziness, tremors, seizures, syncope, weakness, light-headedness and headaches.   Hematological: Negative for adenopathy. Does not bruise/bleed easily.     Patient [facility] with [facility]. Presenting symptoms include patient having increased anxiety and physical when re-directed. He has been eating and sleeping well. Has been increasing his attempts to leave the day room without caretakers knowledge.     Objective   Physical Exam   Constitutional: He appears well-developed and well-nourished. No distress.   Neurological: He is alert. Coordination and gait normal.   Vitals reviewed.    Height 170 cm (66.93\").    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    HISTORY OF PRESENT ILLNESS:  Ms. 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 Family accompanies her for the visit.    Ms. vOiedo has recovered well from her extensive surgery. She has had a good discussion with family about pros and cons of chemotherapy and is wanting to proceed today. No signs/symptoms of locoregional or distant metastatic disease, or acute or chronic illnesses that may preclude chemotherapy delivery today.    ONCOLOGIC HISTORY:  1. High-grade, muscle-invasive, transitional cell carcinoma of right renal pelvis, stage IV ([id number]):  Dec 2017- Started having gross hematuria.   Jan 2018 to September 2018- Persistent gross hematuria and underwent multiple procedures.    2/19/18 and 4/3/18- cystoscopies with bladder biopsies  which were negative for bladder tumor  1/17/18, 3/8/18, 7/26/18, 9/10/18- Multiple urine cytologies have come back positive by FISH for high-grade transitional cell carcinoma  9/10/18- Underwent a bilateral cystoureteroscopy with biopsy and brushing that showed  right upper tract cytology suspicious for high-grade urothelial ca. Right ureter brushing negative from that day. Left upper tract negative.  9/10/18- CT A/P without contrast showed new small bilateral pleural effusions and interstitial pulmonary edema but no evidence of locoregional or metastatic disease.  9/12/18- Presented to ED with oliguria, CRESENCIO, and mild hydronephrosis bilaterally on CT scan and underwent bilateral ureteral stent placment  11/13/2018- Right robotic nephroureterectomy, excision of sandip-caval mass and LN excision by Stuart King. Pathology showed \"Right " "nephroureterectomy: Invasive high grade urothelial carcinoma measuring 3.5 cm, located in renal pelvis and invading through renal parenchyma into perinephric fat. Urothelial carcinoma in-situ present. Margins free of tumor. Ana-caval mass: Metastatic urothelial carcinoma involving one lymph node with at least 1 cm tumor deposit. Pericaval Extranodal Extension present. Five additional benign pericaval lymph nodes. Pathologic stage: pT4N1 (1 of 6 lymph nodes).\"  12/11/18- PET/CT whole body demonstrated significant residual FDG avid disease. For example, \"there is an FDG avid ill-defined pericaval mass immediately medial to the surgical clips measuring approximately 2.0 x 1.4 cm, with max SUV measuring up to12.2, see series 4 image 21. Additional FDG avid retrocaval and interaortocaval lymphadenopathy are noted.There is a 1.2 cm nodule in the right hemipelvis posterior lateral tothe bladder with max SUV measuring 8.3, see series 4 image 77. The bladder is incompletely distended.\" No suspicious thoracic or bone uptake.  12/12/18- Started adjuvant chemotherapy (carbo+gem) per POUT trial.    REVIEW OF SYSTEMS: 14 point ROS negative other than the symptoms noted above in the HPI.    PAST MEDICAL AND SURGICAL HISTORY:   Active Ambulatory Problems     Diagnosis Date Noted     Esophageal reflux 09/22/2004     Restless leg syndrome 10/12/2005     heat intolerance 10/12/2005     Goiter 10/12/2005     Disorder of bone and cartilage 10/12/2005     Other psoriasis 10/12/2005     perirectal cyst 12/16/2005     Malignant melanoma of skin of trunk, except scrotum (H)      Nontoxic multinodular goiter      Hyperlipidemia LDL goal <130 05/09/2010     Hypertension goal BP (blood pressure) < 140/90 11/30/2011     Urinary incontinence 11/30/2011     Osteoarthritis of left knee 11/30/2011     Hip arthritis 06/23/2014     Polymyalgia rheumatica (H) 07/10/2014     High risk medication use 10/15/2014     Shoulder pain 10/31/2014     Impaired " Prescriptions   Medication Sig Dispense Refill   • cetirizine (zyrTEC) 10 MG tablet Take  by mouth.     • clonazePAM (KLONOPIN) 0.5 MG tablet Take one tablet by mouth three times a day 90 tablet 0   • dextromethorphan-quinidine (NUEDEXTA) 20-10 MG capsule capsule Take 1 capsule by mouth Every 12 (Twelve) Hours. 60 capsule 2   • mirtazapine (REMERON) 30 MG tablet Take 1 tablet by mouth Every Night. 30 tablet 2   • risperiDONE (risperDAL) 2 MG tablet Take 1 tablet by mouth at 8pm 30 tablet 2   • Skin Oils (ALPHA ALTA SHOWER & BATH) oil   5     No current facility-administered medications for this visit.        Mental Status Exam:   Hygiene:   fair  Cooperation:  Evasive  Eye Contact:  Poor  Psychomotor Behavior:  Slow  Affect:  flat  Hopelessness: unable to assess patient will not talk  Speech:  Mute  Thought Process:  Unable to demonstrate  Thought Content:  Unable to demonstrate  Suicidal:  unable to asess patient refused to talk. Per caregiver no.  Homicidal:  unable to asess patient refused to talk. Per caregiver no.  Hallucinations:  unable to asess patient refused to talk. Per caregiver no.  Delusion:  Other unable to asess patient refused to talk. Per caregiver no.  Memory:  Unable to evaluate  Orientation:  Unable to evaluate  Reliability:  poor  Insight:  Poor  Judgement:  Poor  Impulse Control:  Poor  Physical/Medical Issues:  No     Assessment/Plan   Problems Addressed this Visit     None      Visit Diagnoses     PBA (pseudobulbar affect)    -  Primary    Relevant Medications    dextromethorphan-quinidine (NUEDEXTA) 20-10 MG capsule capsule    History of traumatic head injury        Acquired intellectual disability        Relevant Medications    dextromethorphan-quinidine (NUEDEXTA) 20-10 MG capsule capsule    risperiDONE (risperDAL) 2 MG tablet    mirtazapine (REMERON) 30 MG tablet              Discussed medication options.  Continue the above medications and increase risperal to 2 mg as it his risk for  elopment has increased. Reviewed the risks, benefits, and side effects of the medications; patient acknowledged and verbally consented.  Patient is agreeable to call the [facility].  Patient is aware to call 911 or go to the nearest ER should begin having SI/HI.               fasting blood sugar 11/24/2015     Chronic bilateral low back pain without sciatica 12/05/2016     Obesity, unspecified obesity severity, unspecified obesity type 02/22/2017     Iron deficiency anemia, unspecified iron deficiency anemia type 11/30/2017     NSTEMI (non-ST elevated myocardial infarction) (H) 12/13/2017     Stress-induced cardiomyopathy 12/14/2017     A-fib (H) 01/16/2018     Anxiety 02/07/2018     Chronic systolic heart failure (H) 02/20/2018     Urothelial carcinoma (H) 03/22/2018     Hyperthyroidism 06/28/2018     Restless legs syndrome 07/11/2018     CRESENCIO (acute kidney injury) (H) 09/11/2018     Hydronephrosis 09/11/2018     Urothelial carcinoma of right distal ureter (H) 11/13/2018     Graves disease 12/04/2018     Resolved Ambulatory Problems     Diagnosis Date Noted     Undiagnosed cardiac murmurs      Knee pain 01/09/2013     Disorder of bursae and tendons in shoulder region 09/30/2014     Sepsis (H) 10/30/2015     Shoulder joint pain, unspecified laterality 03/30/2016     Injury of right rotator cuff 03/30/2016     Atrial fibrillation, unspecified type (H) 01/25/2018     (HFpEF) heart failure with preserved ejection fraction (H) 07/06/2018     Past Medical History:   Diagnosis Date     Calculus of kidney      Esophageal reflux      GERD (gastroesophageal reflux disease)      Hyperlipidemia LDL goal <130 5/9/2010     Malignant melanoma of skin of trunk, except scrotum (H)      Nonspecific abnormal finding      Nontoxic multinodular goiter      Osteopenia      Other psoriasis      Personal history of colonic polyps      PMR (polymyalgia rheumatica) (H)      Stress-induced cardiomyopathy      Undiagnosed cardiac murmurs      Unspecified constipation      Unspecified essential hypertension      Urothelial carcinoma (H) 3/22/2018     SOCIAL HISTORY:   Social History     Tobacco Use     Smoking status: Never Smoker     Smokeless tobacco: Never Used   Substance Use Topics     Alcohol use: No      Alcohol/week: 0.0 oz     Comment: 6 times per year-one drink     Drug use: No     FAMILY HISTORY:   Family History   Problem Relation Age of Onset     Cancer Father         dec - esophageal and laryngeal     Heart Disease Mother      Respiratory Mother         dec     Breast Cancer Daughter      Other Cancer Daughter      Thyroid Disease Daughter      Asthma Daughter      Hyperlipidemia Son      Diabetes Son      ALLERGIES:   Allergies   Allergen Reactions     Codeine      CURRENT MEDICATIONS:   Current Outpatient Medications:      acetaminophen (TYLENOL) 650 MG CR tablet, Take 1 tablet (650 mg) by mouth every 8 hours as needed for pain, Disp: 100 tablet, Rfl: 0     alendronate (FOSAMAX) 70 MG tablet, TAKE 1 TABLET EVERY 7 DAYS AT LEAST 60 MINUTES BEFORE BREAKFAST AS DIRECTED. SEE PACKAGE FOR ADDITIONAL INSTRUCTIONS (Patient taking differently: TAKE 1 TABLET EVERY 7 DAYS AT LEAST 60 MINUTES BEFORE BREAKFAST AS DIRECTED. (Tuesdays0), Disp: 12 tablet, Rfl: 0     aspirin 81 MG tablet, Take 81 mg by mouth every evening , Disp: , Rfl:      Calcium Citrate-Vitamin D (CALCIUM + D PO), Take 1 tablet by mouth 2 times daily , Disp: , Rfl:      colestipol (COLESTID) 1 g tablet, Take 1 g by mouth 2 times daily TAKE OTHER MEDS 1 HOUR BEFORE OR 4 HOURS AFTER COLESID, Disp: , Rfl:      cycloSPORINE (RESTASIS) 0.05 % ophthalmic emulsion, Place 1 drop into both eyes 2 times daily, Disp: , Rfl:      ferrous sulfate 325 (65 Fe) MG TBEC EC tablet, Take 325 mg by mouth daily, Disp: , Rfl:      furosemide (LASIX) 20 MG tablet, Take 1 tablet (20 mg) by mouth every morning, Disp: 90 tablet, Rfl: 1     irbesartan (AVAPRO) 300 MG tablet, TAKE 1 TABLET EVERY DAY (Patient taking differently: Take 300 mg by mouth every morning ), Disp: 90 tablet, Rfl: 2     lovastatin (MEVACOR) 40 MG tablet, TAKE 1 TABLET AT BEDTIME (HYPERLIPIDEMIA LDL GOAL BELOW 130), Disp: 90 tablet, Rfl: 2     Melatonin 10 MG TABS tablet, Take 10 mg by mouth as needed for  sleep , Disp: , Rfl:      methimazole (TAPAZOLE) 5 MG tablet, Take 1 tablet (5 mg) by mouth 2 times daily, Disp: 180 tablet, Rfl: 3     nitroGLYcerin (NITROSTAT) 0.4 MG sublingual tablet, For chest pain place 1 tablet under the tongue every 5 minutes for 3 doses. If symptoms persist 5 minutes after 1st dose call 911., Disp: 25 tablet, Rfl: 3     Omega-3 Fatty Acids (FISH OIL) 500 MG CAPS, Take 1 capsule by mouth 2 times daily , Disp: , Rfl:      omeprazole (PRILOSEC) 20 MG CR capsule, Take 1 capsule (20 mg) by mouth daily (Patient taking differently: Take 20 mg by mouth every morning ), Disp: 180 capsule, Rfl: 3     order for DME, Equipment being ordered: Knee high compression for B LE 20-30mmHg, Velcro units for night time (consider hybrid sock as foot swelling is less than leg swelling), Donning device, Disp: 1 each, Rfl: 2     polyethylene glycol (MIRALAX/GLYCOLAX) packet, Take 17 g by mouth, Disp: , Rfl:      Probiotic Product (PROBIOTIC ADVANCED PO), Take 1 capsule by mouth every morning , Disp: , Rfl:      rOPINIRole (REQUIP) 0.25 MG tablet, Take 1 tablet (0.25 mg) by mouth 2 times daily as needed (restless leg syndrome), Disp: 180 tablet, Rfl: 1     senna-docusate (SENOKOT-S;PERICOLACE) 8.6-50 MG per tablet, Take 1 tablet by mouth 2 times daily To prevent constipation, Disp: 60 tablet, Rfl: 0     sertraline (ZOLOFT) 100 MG tablet, Take 1 tablet (100 mg) by mouth every morning, Disp: 90 tablet, Rfl: 1     traZODone (DESYREL) 50 MG tablet, TAKE 1/2 TABLET(25 MG)  AT BEDTIME, Disp: 45 tablet, Rfl: 1     dexamethasone (DECADRON) 4 MG tablet, Take 8 mg by mouth daily Take for 3 days, starting the day after cisplatin (Day 2).. (Patient not taking: Reported on 12/12/2018.), Disp: 6 tablet, Rfl: 5     HYDROcodone-acetaminophen (NORCO) 5-325 MG per tablet, Take 1 tablet by mouth every 6 hours as needed for severe pain (Patient not taking: Reported on 12/12/2018), Disp: 10 tablet, Rfl: 0     ondansetron (ZOFRAN) 8 MG  tablet, Take 1 tablet (8 mg) by mouth every 8 hours as needed (Nausea/Vomiting), Disp: 30 tablet, Rfl: 5     oxyCODONE IR (ROXICODONE) 5 MG tablet, Take 1 tablet (5 mg) by mouth every 4 hours as needed for moderate to severe pain (Patient not taking: Reported on 12/12/2018), Disp: 15 tablet, Rfl: 0     prochlorperazine (COMPAZINE) 10 MG tablet, Take 0.5 tablets (5 mg) by mouth every 6 hours as needed (Nausea/Vomiting - take if Zofran doesn't work after 30 mins.), Disp: 30 tablet, Rfl: 5     propranolol ER (INDERAL LA) 60 MG 24 hr capsule, TAKE 1 CAPSULE EVERY DAY, Disp: 30 capsule, Rfl: 0    PHYSICAL EXAMINATION:  Vital signs: /50 (BP Location: Left arm, Patient Position: Chair, Cuff Size: Adult Regular)   Pulse 98   Temp 96.8  F (36  C) (Oral)   Wt 64.4 kg (141 lb 14.4 oz)   SpO2 97%   BMI 30.71 kg/m    ECOG performance status of 1. Living independently at home.  GENERAL: Well-nourished healthy-appearing sitting in chair, no acute distress.   HEENT: No icterus, no pallor. Moist mucous membranes. Oropharynx is clear.   NECK: Supple, no JVD/LAD.  LUNGS: Clear to ausculation bilaterally, normal work of breathing.   CARDIOVASCULAR: Regular rate and rhythm, no murmurs, gallops or rubs.   ABDOMEN: Soft, nontender and nondistended, no palpable masses, bowel sounds present.  EXTREMITIES: No cyanosis, no clubbing, b/l LE edema improved.  NEUROLOGIC: No focal deficits, CN 2-12 intact.  LYMPH NODE EXAM: No palpable adenopathy - cervical, axillary or inguinal.     LABORATORY DATA:  Recent Labs   Lab Test 12/12/18  1050 11/15/18  0758 11/14/18  0632  09/11/18  0612 09/11/18  0045   WBC 7.0 10.0 11.3*   < > 12.3* 10.2   RBC 3.92 3.69* 3.71*   < > 3.65* 3.70*   HGB 10.5* 10.1* 10.0*   < > 10.0* 10.3*   HCT 33.7* 33.0* 32.9*   < > 31.7* 32.6*   MCV 86 89 89   < > 87 88   MCH 26.8 27.4 27.0   < > 27.4 27.8   MCHC 31.2* 30.6* 30.4*   < > 31.5 31.6   RDW 14.9 15.8* 15.9*   < > 14.5 14.3    230 207   < > 202 214  "  NEUTROPHIL 65.4  --   --   --  80.0 81.4    < > = values in this interval not displayed.     Recent Labs   Lab Test 12/12/18  1050 11/15/18  0758 11/14/18  0632   * 135 136   POTASSIUM 4.5 4.7 4.7   CHLORIDE 99 104 104   CO2 25 28 27   ANIONGAP 9 3 6   * 94 98   BUN 34* 13 17   CR 1.16* 1.05* 1.00   SHREYA 8.9 8.2* 8.2*     Recent Labs   Lab Test 12/12/18  1050 09/11/18  0612 07/16/18  0846   PROTTOTAL 7.3 6.5* 6.6*   ALBUMIN 3.0* 3.1* 3.1*   BILITOTAL 0.3 0.7 0.5   ALKPHOS 95 129 84   AST 20 103* 24   ALT 20 92* 32     IMAGING STUDIES:  PET/CT 12/11/18: \"There is a seroma in the surgical fossa. FDG avid disease in theretroperitoneum adjacent to surgical clips and retrocaval andinteraortocaval lymphadenopathy. FDG avid focus right posterolateralto urinary bladder might represent another node.\"    ASSESSMENT AND PLAN: Ms. Oviedo is a delightful 87-year-old lady with recently diagnosed localized stage IV ([id number]) high-grade, muscle-invasive transitional cell carcinoma of right renal pelvis, status post right robotic nephroureterectomy, here for follow-up.   - I discussed the PET/CT scan report with patient and family. Unfortunately, but not unexpectedly, it is highly suspicious for significant residual lymph node disease burden. We discussed the implications of this finding in that it is very unlikely that we will be able to cure her cancer given the extensive spread.   - We had previously discussed adjuvant chemotherapy per POUT trial, but these findings make it very likely that chemotherapy is now going to be of palliative intent.  - She and her family understand these points and want to proceed with chemotherapy. They've had a couple of weeks to read and understand the regimen and side effect profile of carboplatin and gemcitabine.  - Patient is independent and living by self. No clear contraindications for cisplatin based therapy but she has a long list of comorbidities that are expected diminish her " ability to tolerate chemotherapy. She also has multiple chronic cardiac issues but recent ECHO (9/10/18) showed LVEF of 55-60%.  - I reviewed the side effect of cisplatin/carboplatin and gemcitabine chemotherapy at length including fatigue, nausea, vomiting, diarrhea, myelosuppression, nephropathy, neuropathy, and other side effects including second malignacies etc.   - Port has been placed.  - Plan for carboplatin 5AUC IV on day 1 with gemcitabine 1000mg/m2 IV on days 1 and 8 of each 3 week cycle for 4-6 cycles. Restaging scan (CT without contrast to protect renal function) after every 2 cycles.  - Start cycle 1 day 1 of chemo today.   All of the above was explained to the patient and family in lay language and several questions were answered. They are in agreement with the plan.  BILLIN.  Jaden Toledo M.D.  . Professor of Medicine  Genitourinary Oncology  Division of Hematology, Oncology & Transplantation  [facility]

## 2019-01-10 RX ORDER — CLONAZEPAM 0.5 MG/1
TABLET ORAL
Qty: 90 TABLET | Refills: 0 | Status: SHIPPED | OUTPATIENT
Start: 2019-01-10 | End: 2019-02-13 | Stop reason: SDUPTHER

## 2019-02-14 RX ORDER — CLONAZEPAM 0.5 MG/1
TABLET ORAL
Qty: 90 TABLET | Refills: 0 | Status: SHIPPED | OUTPATIENT
Start: 2019-02-14 | End: 2019-03-05 | Stop reason: SDUPTHER

## 2019-02-25 DIAGNOSIS — F48.2 PBA (PSEUDOBULBAR AFFECT): ICD-10-CM

## 2019-02-25 RX ORDER — DEXTROMETHORPHAN HYDROBROMIDE AND QUINIDINE SULFATE 20; 10 MG/1; MG/1
CAPSULE, GELATIN COATED ORAL
Qty: 60 CAPSULE | Refills: 2 | Status: CANCELLED | OUTPATIENT
Start: 2019-02-25

## 2019-02-25 RX ORDER — MIRTAZAPINE 30 MG/1
TABLET, FILM COATED ORAL
Qty: 30 TABLET | Refills: 5 | Status: CANCELLED | OUTPATIENT
Start: 2019-02-25

## 2019-03-04 DIAGNOSIS — F48.2 PBA (PSEUDOBULBAR AFFECT): ICD-10-CM

## 2019-03-04 RX ORDER — MIRTAZAPINE 30 MG/1
30 TABLET, FILM COATED ORAL NIGHTLY
Qty: 30 TABLET | Refills: 2 | Status: SHIPPED | OUTPATIENT
Start: 2019-03-04 | End: 2019-03-05 | Stop reason: SDUPTHER

## 2019-03-05 ENCOUNTER — OFFICE VISIT (OUTPATIENT)
Dept: PSYCHIATRY | Facility: CLINIC | Age: 39
End: 2019-03-05

## 2019-03-05 VITALS — WEIGHT: 176 LBS | BODY MASS INDEX: 27.62 KG/M2 | HEIGHT: 67 IN

## 2019-03-05 DIAGNOSIS — F79 ACQUIRED INTELLECTUAL DISABILITY: ICD-10-CM

## 2019-03-05 DIAGNOSIS — Z87.828 HISTORY OF TRAUMATIC HEAD INJURY: ICD-10-CM

## 2019-03-05 DIAGNOSIS — F48.2 PBA (PSEUDOBULBAR AFFECT): Primary | ICD-10-CM

## 2019-03-05 PROCEDURE — 99214 OFFICE O/P EST MOD 30 MIN: CPT | Performed by: NURSE PRACTITIONER

## 2019-03-05 RX ORDER — AMMONIUM LACTATE 12 G/100G
CREAM TOPICAL
Refills: 5 | COMMUNITY
Start: 2019-02-12

## 2019-03-05 RX ORDER — MIRTAZAPINE 30 MG/1
30 TABLET, FILM COATED ORAL NIGHTLY
Qty: 30 TABLET | Refills: 2
Start: 2019-03-05 | End: 2019-05-16 | Stop reason: SDUPTHER

## 2019-03-05 RX ORDER — CLONAZEPAM 0.5 MG/1
TABLET ORAL
Qty: 90 TABLET | Refills: 0 | Status: SHIPPED | OUTPATIENT
Start: 2019-03-05 | End: 2019-04-09 | Stop reason: SDUPTHER

## 2019-03-05 RX ORDER — RISPERIDONE 3 MG/1
TABLET ORAL
Qty: 30 TABLET | Refills: 5
Start: 2019-03-05 | End: 2019-04-09 | Stop reason: SDUPTHER

## 2019-03-05 RX ORDER — ERGOCALCIFEROL 1.25 MG/1
CAPSULE ORAL
Refills: 0 | COMMUNITY
Start: 2019-02-12

## 2019-03-05 RX ORDER — POLYETHYLENE GLYCOL 3350 17 G/17G
POWDER, FOR SOLUTION ORAL
Refills: 5 | COMMUNITY
Start: 2019-02-12

## 2019-03-05 NOTE — PROGRESS NOTES
Subjective   Murali Coyle is a 38 y.o. male is here today for medication management follow-up at the [facility], he presents to his appointment on time.  He presents with a caregiver from [facility].      Chief Complaint: Follow-up for agitation    History of Present Illness  Patient is unable to communicate with provider. He states that he has been having good and bad days, his is constantly up walking and pacing and often appears to be contemplating about something per caregiver.  According to report from [employer] it is reported that he has been more anxious during the day, he seems more worried, and wanders around a lot.  Denies any acting out episodes.  It was reported that he has been sleeping without any problems, he has been eating well with no significant weight changes.  No reported recent illness.  No acute stressors reported, just needs personal space.  No acting out or self harming behaviors reported. No evidence noted of responding to internal or external stimuli.      Considered changing medications but some of his behaviors may be associated with limited ability to participate in activities outside.  Will monitor and was told should he begin having any worsening of symptoms then to call the Clinic.     The following portions of the patient's history were reviewed and updated as appropriate: allergies, current medications, past family history, past medical history, past social history, past surgical history and problem list.    Review of Systems   Constitutional: Negative for appetite change, chills, diaphoresis, fatigue, fever and unexpected weight change.   HENT: Negative for hearing loss, sore throat, trouble swallowing and voice change.    Eyes: Negative for photophobia and visual disturbance.   Respiratory: Negative for cough, chest tightness and shortness of breath.    Cardiovascular: Negative for chest pain and palpitations.   Gastrointestinal: Negative for abdominal pain,  "constipation, nausea and vomiting.   Endocrine: Negative for cold intolerance and heat intolerance.   Genitourinary: Negative for dysuria and frequency.   Musculoskeletal: Negative for arthralgias, back pain, joint swelling and neck stiffness.   Skin: Negative for color change and wound.   Allergic/Immunologic: Negative for environmental allergies and immunocompromised state.   Neurological: Negative for dizziness, tremors, seizures, syncope, weakness, light-headedness and headaches.   Hematological: Negative for adenopathy. Does not bruise/bleed easily.     Objective   Physical Exam   Constitutional: He appears well-nourished.   Neurological:   Unable to communicate      Height 170.2 cm (67.01\"), weight 79.8 kg (176 lb).  Refused     Medication List:   Current Outpatient Medications   Medication Sig Dispense Refill   • ammonium lactate (AMLACTIN) 12 % cream APPLY TO AFFECTED AREAS 2 TIMES A DAY AS NEEDED  5   • cetirizine (zyrTEC) 10 MG tablet Take  by mouth.     • clonazePAM (KLONOPIN) 0.5 MG tablet Take one tablet by mouth three times a day 90 tablet 0   • dextromethorphan-quinidine (NUEDEXTA) 20-10 MG capsule capsule Take 1 capsule by mouth Every 12 (Twelve) Hours. 60 capsule 2   • mirtazapine (REMERON) 30 MG tablet Take 1 tablet by mouth Every Night. 30 tablet 2   • polyethylene glycol (MIRALAX) powder MIX 17GM IN 8OZ WATER OR JUICE AND DRINK DAILY  5   • risperiDONE (risperDAL) 3 MG tablet Take 1 tablet by mouth at 8pm 30 tablet 5   • Skin Oils (ALPHA ALTA SHOWER & BATH) oil   5   • vitamin D (ERGOCALCIFEROL) 81992 units capsule capsule TAKE ONE CAPSULE BY MOUTH ONCE EVERY WEEK  0     No current facility-administered medications for this visit.      Mental Status Exam:   Hygiene:   fair  Cooperation:  Guarded  Eye Contact:  Fair  Psychomotor Behavior:  Aggitated  Affect:  Incongruent  Hopelessness: Denies  Speech:  Limited  Thought Process:  blocked  Thought Content:  Unable to demonstrate  Suicidal:  No self " injurous behavior reported  Homicidal:  No violent behavior toward others reported  Hallucinations:  No response noted to internal or external stimuli  Delusion:  Other Unable to assess  Memory:  Unable to evaluate  Orientation:  Person  Reliability:  poor  Insight:  None  Judgement:  Impaired  Impulse Control:  Impaired  Physical/Medical Issues:  No     Assessment/Plan   Problems Addressed this Visit     None      Visit Diagnoses     PBA (pseudobulbar affect)    -  Primary    History of traumatic head injury        Acquired intellectual disability        Relevant Medications    risperiDONE (risperDAL) 3 MG tablet    mirtazapine (REMERON) 30 MG tablet        Clonazepam 0.5mg tid prn for anxiety    Discussed medication options. Continue mirtazapine for sleep;  continue risperidone for impulse control, and clonazepam for anxiety.  Reviewed the risks, benefits, and side effects of the medications; patient acknowledged and verbally consented.  Patient is agreeable to call the [facility].  Patient is aware to call 911 or go to the nearest ER should begin having SI/HI.     Prognosis:  Guarded dependent on medication, follow up appointment and treatment plan compliance     Functionality: Poor.  Patient unable to communicate and requires assistance to complete ADLs.      Return in 16 weeks

## 2019-04-09 RX ORDER — CLONAZEPAM 0.5 MG/1
TABLET ORAL
Qty: 90 TABLET | Refills: 0 | Status: SHIPPED | OUTPATIENT
Start: 2019-04-09 | End: 2019-05-14 | Stop reason: SDUPTHER

## 2019-04-09 RX ORDER — RISPERIDONE 3 MG/1
TABLET ORAL
Qty: 30 TABLET | Refills: 5
Start: 2019-04-09 | End: 2019-04-22 | Stop reason: SDUPTHER

## 2019-04-09 NOTE — TELEPHONE ENCOUNTER
Rx request for Klonopin, next appointment is 6/20/19. Also, they have not received Risperdal for patient. After looking in the chart it does not show that Rx was confirmed by pharmacy it looks like there could have been a glitch, can you resend that as well?

## 2019-04-09 NOTE — TELEPHONE ENCOUNTER
They have not received RX last months that was sent in they never received it had 5 refills. Please cover for Destinee

## 2019-04-15 ENCOUNTER — TELEPHONE (OUTPATIENT)
Dept: PSYCHIATRY | Facility: CLINIC | Age: 39
End: 2019-04-15

## 2019-04-15 RX ORDER — RISPERIDONE 1 MG/1
1 TABLET ORAL EVERY MORNING
Qty: 30 TABLET | Refills: 2 | Status: SHIPPED | OUTPATIENT
Start: 2019-04-15 | End: 2019-06-20 | Stop reason: SDUPTHER
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
## 2019-04-22 ENCOUNTER — TELEPHONE (OUTPATIENT)
Dept: PSYCHIATRY | Facility: CLINIC | Age: 39
End: 2019-04-22

## 2019-04-22 RX ORDER — RISPERIDONE 3 MG/1
TABLET ORAL
Qty: 30 TABLET | Refills: 5 | Status: SHIPPED | OUTPATIENT
Start: 2019-04-22 | End: 2019-06-20 | Stop reason: SDUPTHER

## 2019-04-22 NOTE — TELEPHONE ENCOUNTER
Karely from [facility] called stating that patient has not received Risperdal in over a month. After looking in his chart it looks like the e-scribe did not go through, it may have printed by accident. This is the first phone call I have received regarding this.  Can you resend please?

## 2019-05-14 RX ORDER — CLONAZEPAM 0.5 MG/1
TABLET ORAL
Qty: 90 TABLET | Refills: 0 | Status: SHIPPED | OUTPATIENT
Start: 2019-05-14 | End: 2019-06-10 | Stop reason: SDUPTHER

## 2019-05-16 DIAGNOSIS — F48.2 PBA (PSEUDOBULBAR AFFECT): ICD-10-CM

## 2019-05-16 RX ORDER — MIRTAZAPINE 30 MG/1
30 TABLET, FILM COATED ORAL NIGHTLY
Qty: 30 TABLET | Refills: 2 | Status: SHIPPED | OUTPATIENT
Start: 2019-05-16 | End: 2019-06-20 | Stop reason: SDUPTHER

## 2019-06-11 RX ORDER — CLONAZEPAM 0.5 MG/1
TABLET ORAL
Qty: 27 TABLET | Refills: 0 | Status: SHIPPED | OUTPATIENT
Start: 2019-06-11 | End: 2019-06-20 | Stop reason: SDUPTHER

## 2019-06-20 ENCOUNTER — OFFICE VISIT (OUTPATIENT)
Dept: PSYCHIATRY | Facility: CLINIC | Age: 39
End: 2019-06-20

## 2019-06-20 DIAGNOSIS — F79 ACQUIRED INTELLECTUAL DISABILITY: ICD-10-CM

## 2019-06-20 DIAGNOSIS — F48.2 PBA (PSEUDOBULBAR AFFECT): ICD-10-CM

## 2019-06-20 DIAGNOSIS — Z87.828 HISTORY OF TRAUMATIC HEAD INJURY: Primary | ICD-10-CM

## 2019-06-20 PROCEDURE — 99214 OFFICE O/P EST MOD 30 MIN: CPT | Performed by: NURSE PRACTITIONER

## 2019-06-20 RX ORDER — MIRTAZAPINE 30 MG/1
30 TABLET, FILM COATED ORAL NIGHTLY
Qty: 30 TABLET | Refills: 2 | Status: SHIPPED | OUTPATIENT
Start: 2019-06-20 | End: 2019-06-20 | Stop reason: SDUPTHER

## 2019-06-20 RX ORDER — CLONAZEPAM 0.5 MG/1
TABLET ORAL
Qty: 90 TABLET | Refills: 0 | Status: SHIPPED | OUTPATIENT
Start: 2019-06-20 | End: 2019-08-01 | Stop reason: SDUPTHER

## 2019-06-20 RX ORDER — CLONAZEPAM 0.5 MG/1
TABLET ORAL
Qty: 90 TABLET | Refills: 0 | Status: SHIPPED | OUTPATIENT
Start: 2019-06-20 | End: 2019-06-20 | Stop reason: SDUPTHER

## 2019-06-20 RX ORDER — RISPERIDONE 1 MG/1
1 TABLET ORAL EVERY MORNING
Qty: 30 TABLET | Refills: 2 | Status: SHIPPED | OUTPATIENT
Start: 2019-06-20 | End: 2019-06-20 | Stop reason: SDUPTHER

## 2019-06-20 RX ORDER — MIRTAZAPINE 30 MG/1
30 TABLET, FILM COATED ORAL NIGHTLY
Qty: 30 TABLET | Refills: 2 | Status: SHIPPED | OUTPATIENT
Start: 2019-06-20 | End: 2019-08-01 | Stop reason: SDUPTHER

## 2019-06-20 RX ORDER — ACETAMINOPHEN 325 MG/1
TABLET ORAL
Refills: 2 | COMMUNITY
Start: 2019-06-10

## 2019-06-20 RX ORDER — RISPERIDONE 3 MG/1
TABLET ORAL
Qty: 30 TABLET | Refills: 2 | Status: SHIPPED | OUTPATIENT
Start: 2019-06-20 | End: 2019-08-01 | Stop reason: SDUPTHER

## 2019-06-20 RX ORDER — FLUTICASONE PROPIONATE 50 MCG
SPRAY, SUSPENSION (ML) NASAL
Refills: 0 | COMMUNITY
Start: 2019-04-17

## 2019-06-20 RX ORDER — RISPERIDONE 1 MG/1
TABLET ORAL
Qty: 30 TABLET | Refills: 2 | OUTPATIENT
Start: 2019-06-20

## 2019-06-20 RX ORDER — RISPERIDONE 1 MG/1
1 TABLET ORAL EVERY MORNING
Qty: 30 TABLET | Refills: 2 | Status: SHIPPED | OUTPATIENT
Start: 2019-06-20 | End: 2019-08-01 | Stop reason: SDUPTHER

## 2019-06-20 RX ORDER — RISPERIDONE 3 MG/1
TABLET ORAL
Qty: 30 TABLET | Refills: 2 | Status: SHIPPED | OUTPATIENT
Start: 2019-06-20 | End: 2019-06-20 | Stop reason: SDUPTHER

## 2019-06-20 NOTE — PROGRESS NOTES
Subjective   Murali Coyle is a 39 y.o. male is here today for medication management follow-up at the [facility], he presents to his appointment on time.  He presents with a caregiver from [facility].      Interactive complexity related to inability to communicate and must be accompanied by a provider.     Chief Complaint: Follow-up for agitation    History of Present Illness  Staff reported that he is doing about the same with no changes in behaviors.  He is not having any problems at his day program or group home.  He is getting alone with his roommates.  No physical altercations reported.  He continues to get his medications as at scheduled time with no SE or problems. He is not responding to any internal or external stimuli.  No reports of self-mutilating behaviors.  He is sleeping about 7 hours per night with no issues. His appetite is good, he maintains on a schedule.  No new health issues, he completed a swallow study that indicated no problems.      Considered changing medications but some of his behaviors may be associated with limited ability to participate in activities outside.  Will monitor and was told should he begin having any worsening of symptoms then to call the Clinic.     The following portions of the patient's history were reviewed and updated as appropriate: allergies, current medications, past family history, past medical history, past social history, past surgical history and problem list.    Review of Systems   Constitutional: Negative for appetite change, chills, diaphoresis, fatigue, fever and unexpected weight change.   HENT: Negative for hearing loss, sore throat, trouble swallowing and voice change.    Eyes: Negative for photophobia and visual disturbance.   Respiratory: Negative for cough, chest tightness and shortness of breath.    Cardiovascular: Negative for chest pain and palpitations.   Gastrointestinal: Negative for abdominal pain, constipation, nausea and vomiting.    Endocrine: Negative for cold intolerance and heat intolerance.   Genitourinary: Negative for dysuria and frequency.   Musculoskeletal: Negative for arthralgias, back pain, joint swelling and neck stiffness.   Skin: Negative for color change and wound.   Allergic/Immunologic: Negative for environmental allergies and immunocompromised state.   Neurological: Negative for dizziness, tremors, seizures, syncope, weakness, light-headedness and headaches.   Hematological: Negative for adenopathy. Does not bruise/bleed easily.     Objective   Physical Exam   Constitutional: He appears well-nourished.   Neurological:   Unable to communicate      There were no vitals taken for this visit.  Refused     Medication List:   Current Outpatient Medications   Medication Sig Dispense Refill   • ammonium lactate (AMLACTIN) 12 % cream APPLY TO AFFECTED AREAS 2 TIMES A DAY AS NEEDED  5   • cetirizine (zyrTEC) 10 MG tablet Take  by mouth.     • clonazePAM (KlonoPIN) 0.5 MG tablet TAKE ONE TABLET BY MOUTH 3 TIMES A DAY 90 tablet 0   • dextromethorphan-quinidine (NUEDEXTA) 20-10 MG capsule capsule Take 1 capsule by mouth Every 12 (Twelve) Hours. 60 capsule 2   • fluticasone (FLONASE) 50 MCG/ACT nasal spray SPRAY 2 SPRAYS IN EACH NOSTRIL ONCE DAILY FOR 14 DAYS  0   • MAPAP 325 MG tablet TAKE 2 TABLETS 2 TIMES A DAY AS NEEDED FOR HEADACHE,PAIN OR TEMP OVER 100.4 MAX 30 TABLETS FOR 30 DAYS  2   • mirtazapine (REMERON) 30 MG tablet Take 1 tablet by mouth Every Night. 30 tablet 2   • polyethylene glycol (MIRALAX) powder MIX 17GM IN 8OZ WATER OR JUICE AND DRINK DAILY  5   • risperiDONE (RISPERDAL) 1 MG tablet Take 1 tablet by mouth Every Morning. 30 tablet 2   • risperiDONE (risperDAL) 3 MG tablet Take 1 tablet by mouth at 8pm 30 tablet 2   • Skin Oils (ALPHA ALTA SHOWER & BATH) oil   5   • vitamin D (ERGOCALCIFEROL) 47334 units capsule capsule TAKE ONE CAPSULE BY MOUTH ONCE EVERY WEEK  0     No current facility-administered medications for  this visit.      Mental Status Exam:   Hygiene:   fair  Cooperation:  Guarded  Eye Contact:  Fair  Psychomotor Behavior:  Aggitated  Affect:  Incongruent  Hopelessness: Denies  Speech:  Limited  Thought Process:  blocked  Thought Content:  Unable to demonstrate  Suicidal:  No self injurous behavior reported  Homicidal:  No violent behavior toward others reported  Hallucinations:  No response noted to internal or external stimuli  Delusion:  Other Unable to assess  Memory:  Unable to evaluate  Orientation:  Person  Reliability:  poor  Insight:  None  Judgement:  Impaired  Impulse Control:  Impaired  Physical/Medical Issues:  No     Assessment/Plan   Problems Addressed this Visit     None      Visit Diagnoses     History of traumatic head injury    -  Primary    PBA (pseudobulbar affect)        Acquired intellectual disability        Relevant Medications    mirtazapine (REMERON) 30 MG tablet    risperiDONE (RISPERDAL) 1 MG tablet    risperiDONE (risperDAL) 3 MG tablet        Clonazepam 0.5mg tid prn for anxiety    Discussed medication options. Continue mirtazapine for sleep;  continue risperidone for impulse control, and clonazepam for anxiety.  Reviewed the risks, benefits, and side effects of the medications; patient acknowledged and verbally consented.  Patient is agreeable to call the [facility].  Patient is aware to call 911 or go to the nearest ER should begin having SI/HI.     Prognosis:  Guarded dependent on medication, follow up appointment and treatment plan compliance     Functionality: Poor.  Patient unable to communicate and requires assistance to complete ADLs.      Return in 16 weeks

## 2019-07-17 ENCOUNTER — TELEPHONE (OUTPATIENT)
Dept: PSYCHIATRY | Facility: CLINIC | Age: 39
End: 2019-07-17

## 2019-07-17 NOTE — TELEPHONE ENCOUNTER
"Karely from [facility] called stating that they think the Risperdal is too much for patient as he seems \"out of it\" and has excessive drooling. They asked if he could come for a sooner appointment as his next one is not until 9/26/19.   "

## 2019-07-18 NOTE — TELEPHONE ENCOUNTER
Karely from [facility] stated that she would be ok with patient seeing another provider until he gets back in to see Shelli. Can someone make him an appt? I can call and let her know date and time.

## 2019-07-22 ENCOUNTER — TELEPHONE (OUTPATIENT)
Dept: PSYCHIATRY | Facility: CLINIC | Age: 39
End: 2019-07-22

## 2019-08-01 ENCOUNTER — OFFICE VISIT (OUTPATIENT)
Dept: PSYCHIATRY | Facility: CLINIC | Age: 39
End: 2019-08-01

## 2019-08-01 DIAGNOSIS — F79 ACQUIRED INTELLECTUAL DISABILITY: ICD-10-CM

## 2019-08-01 DIAGNOSIS — Z87.828 HISTORY OF TRAUMATIC HEAD INJURY: ICD-10-CM

## 2019-08-01 DIAGNOSIS — F48.2 PBA (PSEUDOBULBAR AFFECT): Primary | ICD-10-CM

## 2019-08-01 PROCEDURE — 99214 OFFICE O/P EST MOD 30 MIN: CPT | Performed by: NURSE PRACTITIONER

## 2019-08-01 RX ORDER — RISPERIDONE 0.5 MG/1
0.5 TABLET ORAL EVERY MORNING
Qty: 30 TABLET | Refills: 2 | Status: SHIPPED | OUTPATIENT
Start: 2019-08-01 | End: 2019-09-26 | Stop reason: SDUPTHER

## 2019-08-01 RX ORDER — CLONAZEPAM 0.5 MG/1
TABLET ORAL
Qty: 90 TABLET | Refills: 0
Start: 2019-08-01 | End: 2019-08-22 | Stop reason: SDUPTHER

## 2019-08-01 RX ORDER — RISPERIDONE 2 MG/1
TABLET ORAL
Qty: 30 TABLET | Refills: 2 | Status: SHIPPED | OUTPATIENT
Start: 2019-08-01 | End: 2019-09-26 | Stop reason: SDUPTHER

## 2019-08-01 RX ORDER — MIRTAZAPINE 30 MG/1
30 TABLET, FILM COATED ORAL NIGHTLY
Qty: 30 TABLET | Refills: 2
Start: 2019-08-01 | End: 2019-09-26 | Stop reason: SDUPTHER

## 2019-08-01 NOTE — PROGRESS NOTES
"  Subjective   Murali Coyle is a 39 y.o. male is here today for medication management follow-up at the [facility], he presents to his appointment on time.  He presents with a caregiver from [facility].      Interactive complexity related to inability to communicate and must be accompanied by a provider.     Chief Complaint: Follow-up for agitation    History of Present Illness   Staff states that he has been doing well at the day program and at home, the problem is associated with extreme fatigue and drooling that he has been having.  Staff states that he is unusually tired most days and goes to a corner and falls asleep, difficulty in waking him up and listening to verbal cures.  He has been \"zoning out\" also for periods of time during the day.  He has not had any aggressive symptoms with no acting out reported.  He has been sleeping too much about during the day and sleeping well at night.  His appetite has been stable with no reported weight gain or loss.  No reports of responding to internal or external stimuli.  No new health issues reported.  No acute stress reported.  No self inflicting behaviors reported.      Considered changing medications but some of his behaviors may be associated with limited ability to participate in activities outside.  Will monitor and was told should he begin having any worsening of symptoms then to call the Clinic.     The following portions of the patient's history were reviewed and updated as appropriate: allergies, current medications, past family history, past medical history, past social history, past surgical history and problem list.    Review of Systems   Constitutional: Negative for appetite change, chills, diaphoresis, fatigue, fever and unexpected weight change.   HENT: Negative for hearing loss, sore throat, trouble swallowing and voice change.    Eyes: Negative for photophobia and visual disturbance.   Respiratory: Negative for cough, chest tightness and " shortness of breath.    Cardiovascular: Negative for chest pain and palpitations.   Gastrointestinal: Negative for abdominal pain, constipation, nausea and vomiting.   Endocrine: Negative for cold intolerance and heat intolerance.   Genitourinary: Negative for dysuria and frequency.   Musculoskeletal: Negative for arthralgias, back pain, joint swelling and neck stiffness.   Skin: Negative for color change and wound.   Allergic/Immunologic: Negative for environmental allergies and immunocompromised state.   Neurological: Negative for dizziness, tremors, seizures, syncope, weakness, light-headedness and headaches.   Hematological: Negative for adenopathy. Does not bruise/bleed easily.     Objective   Physical Exam   Constitutional: He appears well-nourished.   Neurological:   Unable to communicate      There were no vitals taken for this visit.  Refused     Medication List:   Current Outpatient Medications   Medication Sig Dispense Refill   • ammonium lactate (AMLACTIN) 12 % cream APPLY TO AFFECTED AREAS 2 TIMES A DAY AS NEEDED  5   • cetirizine (zyrTEC) 10 MG tablet Take  by mouth.     • clonazePAM (KlonoPIN) 0.5 MG tablet TAKE ONE TABLET BY MOUTH 3 TIMES A DAY 90 tablet 0   • dextromethorphan-quinidine (NUEDEXTA) 20-10 MG capsule capsule Take 1 capsule by mouth Every 12 (Twelve) Hours. 60 capsule 2   • fluticasone (FLONASE) 50 MCG/ACT nasal spray SPRAY 2 SPRAYS IN EACH NOSTRIL ONCE DAILY FOR 14 DAYS  0   • MAPAP 325 MG tablet TAKE 2 TABLETS 2 TIMES A DAY AS NEEDED FOR HEADACHE,PAIN OR TEMP OVER 100.4 MAX 30 TABLETS FOR 30 DAYS  2   • mirtazapine (REMERON) 30 MG tablet Take 1 tablet by mouth Every Night. 30 tablet 2   • polyethylene glycol (MIRALAX) powder MIX 17GM IN 8OZ WATER OR JUICE AND DRINK DAILY  5   • risperiDONE (risperDAL) 0.5 MG tablet Take 1 tablet by mouth Every Morning. 30 tablet 2   • risperiDONE (risperDAL) 2 MG tablet Take 1 tablet by mouth at 8pm 30 tablet 2   • Skin Oils (ALPHA ALTA SHOWER & BATH)  oil   5   • vitamin D (ERGOCALCIFEROL) 01964 units capsule capsule TAKE ONE CAPSULE BY MOUTH ONCE EVERY WEEK  0     No current facility-administered medications for this visit.      Mental Status Exam:   Hygiene:   fair  Cooperation:  Guarded  Eye Contact:  Fair  Psychomotor Behavior:  Aggitated  Affect:  Incongruent  Hopelessness: Denies  Speech:  Limited  Thought Process:  blocked  Thought Content:  Unable to demonstrate  Suicidal:  No self injurous behavior reported  Homicidal:  No violent behavior toward others reported  Hallucinations:  No response noted to internal or external stimuli  Delusion:  Other Unable to assess  Memory:  Unable to evaluate  Orientation:  Person  Reliability:  poor  Insight:  None  Judgement:  Impaired  Impulse Control:  Impaired  Physical/Medical Issues:  No     Assessment/Plan   Problems Addressed this Visit     None      Visit Diagnoses     PBA (pseudobulbar affect)    -  Primary    History of traumatic head injury        Acquired intellectual disability        Relevant Medications    mirtazapine (REMERON) 30 MG tablet    risperiDONE (risperDAL) 0.5 MG tablet    risperiDONE (risperDAL) 2 MG tablet        Clonazepam 0.5mg tid prn for anxiety    Discussed medication options. Continue mirtazapine for sleep;  continue risperidone for impulse control, and clonazepam for anxiety.  Reviewed the risks, benefits, and side effects of the medications; patient acknowledged and verbally consented.  Patient is agreeable to call the [facility].  Patient is aware to call 911 or go to the nearest ER should begin having SI/HI.     Prognosis:  Guarded dependent on medication, follow up appointment and treatment plan compliance     Functionality: Poor.  Patient unable to communicate and requires assistance to complete ADLs.      Return in previously scheduled appointment.

## 2019-08-15 DIAGNOSIS — F48.2 PBA (PSEUDOBULBAR AFFECT): ICD-10-CM

## 2019-08-15 RX ORDER — DEXTROMETHORPHAN HYDROBROMIDE AND QUINIDINE SULFATE 20; 10 MG/1; MG/1
CAPSULE, GELATIN COATED ORAL
Qty: 60 CAPSULE | Refills: 2 | Status: CANCELLED | OUTPATIENT
Start: 2019-08-15

## 2019-08-22 RX ORDER — CLONAZEPAM 0.5 MG/1
TABLET ORAL
Qty: 90 TABLET | Refills: 0 | Status: SHIPPED | OUTPATIENT
Start: 2019-08-22 | End: 2019-09-17 | Stop reason: SDUPTHER

## 2019-08-22 NOTE — TELEPHONE ENCOUNTER
Karely from [facility] called stating that pharmacy had not received Klonopin yet, looks like it printed instead of being e-scribed. Can you resend? Thank you

## 2019-09-17 RX ORDER — CLONAZEPAM 0.5 MG/1
0.5 TABLET ORAL 3 TIMES DAILY
Qty: 90 TABLET | Refills: 0 | Status: SHIPPED | OUTPATIENT
Start: 2019-09-17 | End: 2019-09-26 | Stop reason: SDUPTHER

## 2019-09-26 ENCOUNTER — TELEPHONE (OUTPATIENT)
Dept: PSYCHIATRY | Facility: CLINIC | Age: 39
End: 2019-09-26

## 2019-09-26 ENCOUNTER — OFFICE VISIT (OUTPATIENT)
Dept: PSYCHIATRY | Facility: CLINIC | Age: 39
End: 2019-09-26

## 2019-09-26 DIAGNOSIS — Z87.828 HISTORY OF TRAUMATIC HEAD INJURY: ICD-10-CM

## 2019-09-26 DIAGNOSIS — F63.81 INTERMITTENT EXPLOSIVE DISORDER IN ADULT: ICD-10-CM

## 2019-09-26 DIAGNOSIS — F48.2 PBA (PSEUDOBULBAR AFFECT): Primary | ICD-10-CM

## 2019-09-26 DIAGNOSIS — F79 ACQUIRED INTELLECTUAL DISABILITY: ICD-10-CM

## 2019-09-26 PROCEDURE — 99214 OFFICE O/P EST MOD 30 MIN: CPT | Performed by: NURSE PRACTITIONER

## 2019-09-26 RX ORDER — RISPERIDONE 2 MG/1
TABLET ORAL
Qty: 30 TABLET | Refills: 2 | Status: SHIPPED | OUTPATIENT
Start: 2019-09-26 | End: 2019-12-19 | Stop reason: SDUPTHER

## 2019-09-26 RX ORDER — MIRTAZAPINE 30 MG/1
30 TABLET, FILM COATED ORAL NIGHTLY
Qty: 30 TABLET | Refills: 2 | Status: SHIPPED | OUTPATIENT
Start: 2019-09-26 | End: 2019-12-19 | Stop reason: SDUPTHER

## 2019-09-26 RX ORDER — RISPERIDONE 0.5 MG/1
0.5 TABLET ORAL EVERY MORNING
Qty: 30 TABLET | Refills: 2 | Status: SHIPPED | OUTPATIENT
Start: 2019-09-26 | End: 2019-12-19 | Stop reason: SDUPTHER

## 2019-09-26 RX ORDER — CLONAZEPAM 0.5 MG/1
0.5 TABLET ORAL 3 TIMES DAILY
Qty: 90 TABLET | Refills: 0 | Status: SHIPPED | OUTPATIENT
Start: 2019-09-26 | End: 2019-11-19 | Stop reason: SDUPTHER

## 2019-09-26 RX ORDER — DIPHENHYD/PHENYLEPH/ACETAMINOP 12.5-5-325
TABLET ORAL
Refills: 5 | COMMUNITY
Start: 2019-09-03

## 2019-09-26 RX ORDER — MIRTAZAPINE 30 MG/1
30 TABLET, FILM COATED ORAL NIGHTLY
Qty: 30 TABLET | Refills: 2
Start: 2019-09-26 | End: 2019-09-26 | Stop reason: SDUPTHER

## 2019-09-26 NOTE — TELEPHONE ENCOUNTER
Remeron prescription looks like it accidentally printed instead of being e-scribed, can you resend? Thank you

## 2019-09-26 NOTE — PROGRESS NOTES
Subjective   Murali Coyle is a 39 y.o. male is here today for medication management follow-up at the [facility], he presents to his appointment on time.  He presents with a caregiver from [facility].      Interactive complexity related to inability to communicate and must be accompanied by a provider.     Chief Complaint: Follow-up for agitation    History of Present Illness Staff states that he has been doing well, he has not had any problems at the day program or any problems at his group home.  Staff denies any symptoms of excessive fatigue or drooling.  He is no longer falling asleep at the day program.  Staff denies any acting out or behavioral problems.  Staff states that he is sleeping ok with no waking up or wandering, denies any NM.  He is eating well but has refused to have his weight monitored, staff states that they have not noticed any significant weight changes.  Staff states that they have not observed any response to internal or external stimuli.  Denies any self-harm behaviors or harm to others.       The following portions of the patient's history were reviewed and updated as appropriate: allergies, current medications, past family history, past medical history, past social history, past surgical history and problem list.    Review of Systems   Constitutional: Negative for appetite change, chills, diaphoresis, fatigue, fever and unexpected weight change.   HENT: Negative for hearing loss, sore throat, trouble swallowing and voice change.    Eyes: Negative for photophobia and visual disturbance.   Respiratory: Negative for cough, chest tightness and shortness of breath.    Cardiovascular: Negative for chest pain and palpitations.   Gastrointestinal: Negative for abdominal pain, constipation, nausea and vomiting.   Endocrine: Negative for cold intolerance and heat intolerance.   Genitourinary: Negative for dysuria and frequency.   Musculoskeletal: Negative for arthralgias, back pain,  joint swelling and neck stiffness.   Skin: Negative for color change and wound.   Allergic/Immunologic: Negative for environmental allergies and immunocompromised state.   Neurological: Negative for dizziness, tremors, seizures, syncope, weakness, light-headedness and headaches.   Hematological: Negative for adenopathy. Does not bruise/bleed easily.     Objective   Physical Exam   Constitutional: He appears well-nourished.   Neurological:   Unable to communicate      There were no vitals taken for this visit.  Refused     Medication List:   Current Outpatient Medications   Medication Sig Dispense Refill   • ammonium lactate (AMLACTIN) 12 % cream APPLY TO AFFECTED AREAS 2 TIMES A DAY AS NEEDED  5   • cetirizine (zyrTEC) 10 MG tablet Take  by mouth.     • clonazePAM (KlonoPIN) 0.5 MG tablet Take 1 tablet by mouth 3 (Three) Times a Day. 90 tablet 0   • dextromethorphan-quinidine (NUEDEXTA) 20-10 MG capsule capsule Take 1 capsule by mouth Every 12 (Twelve) Hours. 60 capsule 2   • DIAPER RASH 40 % ointment APPLY TO BOTTOM THREE TIMES DAILY AS NEEDED  5   • fluticasone (FLONASE) 50 MCG/ACT nasal spray SPRAY 2 SPRAYS IN EACH NOSTRIL ONCE DAILY FOR 14 DAYS  0   • MAPAP 325 MG tablet TAKE 2 TABLETS 2 TIMES A DAY AS NEEDED FOR HEADACHE,PAIN OR TEMP OVER 100.4 MAX 30 TABLETS FOR 30 DAYS  2   • mirtazapine (REMERON) 30 MG tablet Take 1 tablet by mouth Every Night. 30 tablet 2   • polyethylene glycol (MIRALAX) powder MIX 17GM IN 8OZ WATER OR JUICE AND DRINK DAILY  5   • risperiDONE (risperDAL) 0.5 MG tablet Take 1 tablet by mouth Every Morning. 30 tablet 2   • risperiDONE (risperDAL) 2 MG tablet Take 1 tablet by mouth at 8pm 30 tablet 2   • Skin Oils (ALPHA ALTA SHOWER & BATH) oil   5   • vitamin D (ERGOCALCIFEROL) 02358 units capsule capsule TAKE ONE CAPSULE BY MOUTH ONCE EVERY WEEK  0     No current facility-administered medications for this visit.      Mental Status Exam:   Hygiene:   fair  Cooperation:  Guarded  Eye Contact:   Fair  Psychomotor Behavior:  Aggitated  Affect:  Incongruent  Hopelessness: Denies  Speech:  Limited  Thought Process:  blocked  Thought Content:  Unable to demonstrate  Suicidal:  No self injurous behavior reported  Homicidal:  No violent behavior toward others reported  Hallucinations:  No response noted to internal or external stimuli  Delusion:  Other Unable to assess  Memory:  Unable to evaluate  Orientation:  Person  Reliability:  poor  Insight:  None  Judgement:  Impaired  Impulse Control:  Impaired  Physical/Medical Issues:  No     Assessment/Plan   Problems Addressed this Visit     None      Visit Diagnoses     PBA (pseudobulbar affect)    -  Primary    History of traumatic head injury        Acquired intellectual disability        Relevant Medications    risperiDONE (risperDAL) 0.5 MG tablet    risperiDONE (risperDAL) 2 MG tablet    mirtazapine (REMERON) 30 MG tablet        Clonazepam 0.5mg tid prn for anxiety    Discussed medication options. Continue mirtazapine for sleep;  continue risperidone for impulse control, and clonazepam for anxiety.  Reviewed the risks, benefits, and side effects of the medications; patient acknowledged and verbally consented.  Patient is agreeable to call the [facility].  Patient is aware to call 911 or go to the nearest ER should begin having SI/HI.     Prognosis:  Guarded dependent on medication, follow up appointment and treatment plan compliance     Functionality: Poor.  Patient unable to communicate and requires assistance to complete ADLs.      Treatment plan completed on 9/26/19    Return in 12 weeks

## 2019-09-26 NOTE — TREATMENT PLAN
Multi-Disciplinary Problems (from Behavioral Health Treatment Plan)    Active Problems     Problem: Intermittent Explosive Disorder (PEDS)  Start Date: 09/26/19    Problem Details:  The patient self scales this problem- unable to communicate but is monitored by staff 24 hours daily.     Goal Priority Start Date Expected End Date End Date    Accepts need for medications -- 09/26/19 -- --    Goal Intervention Frequency Start Date End Date    Teach benefits and side effects of medication Q3 Months -- --    Intervention Details:  Staff to be taught the benefits and side effects of the medications.      Goal Intervention Frequency Start Date End Date    Evaluate medication effectiveness and side effects Q3 Months -- --                       I have discussed and reviewed this treatment plan with the patient.  It has been printed for signatures.

## 2019-11-05 DIAGNOSIS — F48.2 PBA (PSEUDOBULBAR AFFECT): ICD-10-CM

## 2019-11-05 RX ORDER — DEXTROMETHORPHAN HYDROBROMIDE AND QUINIDINE SULFATE 20; 10 MG/1; MG/1
CAPSULE, GELATIN COATED ORAL
Qty: 60 CAPSULE | Refills: 2 | Status: SHIPPED | OUTPATIENT
Start: 2019-11-05 | End: 2019-12-19 | Stop reason: SDUPTHER

## 2019-11-19 RX ORDER — CLONAZEPAM 0.5 MG/1
0.5 TABLET ORAL 3 TIMES DAILY
Qty: 90 TABLET | Refills: 0 | Status: SHIPPED | OUTPATIENT
Start: 2019-11-19 | End: 2019-12-16 | Stop reason: SDUPTHER

## 2019-12-16 DIAGNOSIS — F63.81 INTERMITTENT EXPLOSIVE DISORDER IN ADULT: Primary | ICD-10-CM

## 2019-12-16 RX ORDER — CLONAZEPAM 0.5 MG/1
0.5 TABLET ORAL 3 TIMES DAILY
Qty: 90 TABLET | Refills: 0 | Status: SHIPPED | OUTPATIENT
Start: 2019-12-16 | End: 2019-12-19 | Stop reason: SDUPTHER

## 2019-12-19 ENCOUNTER — OFFICE VISIT (OUTPATIENT)
Dept: PSYCHIATRY | Facility: CLINIC | Age: 39
End: 2019-12-19

## 2019-12-19 VITALS — HEIGHT: 67 IN | BODY MASS INDEX: 27.56 KG/M2

## 2019-12-19 DIAGNOSIS — F79 ACQUIRED INTELLECTUAL DISABILITY: ICD-10-CM

## 2019-12-19 DIAGNOSIS — F63.81 INTERMITTENT EXPLOSIVE DISORDER IN ADULT: Primary | ICD-10-CM

## 2019-12-19 DIAGNOSIS — Z87.828 HISTORY OF TRAUMATIC HEAD INJURY: ICD-10-CM

## 2019-12-19 DIAGNOSIS — F48.2 PBA (PSEUDOBULBAR AFFECT): ICD-10-CM

## 2019-12-19 PROCEDURE — 99214 OFFICE O/P EST MOD 30 MIN: CPT | Performed by: NURSE PRACTITIONER

## 2019-12-19 RX ORDER — MIRTAZAPINE 30 MG/1
30 TABLET, FILM COATED ORAL NIGHTLY
Qty: 30 TABLET | Refills: 3 | Status: SHIPPED | OUTPATIENT
Start: 2019-12-19 | End: 2020-04-23

## 2019-12-19 RX ORDER — RISPERIDONE 0.5 MG/1
0.5 TABLET ORAL EVERY MORNING
Qty: 30 TABLET | Refills: 3 | Status: SHIPPED | OUTPATIENT
Start: 2019-12-19 | End: 2020-01-23 | Stop reason: DRUGHIGH

## 2019-12-19 RX ORDER — CLONAZEPAM 0.5 MG/1
0.5 TABLET ORAL 3 TIMES DAILY
Qty: 90 TABLET | Refills: 0 | Status: SHIPPED | OUTPATIENT
Start: 2019-12-19 | End: 2020-01-20 | Stop reason: SDUPTHER

## 2019-12-19 RX ORDER — RISPERIDONE 2 MG/1
TABLET ORAL
Qty: 30 TABLET | Refills: 3 | Status: SHIPPED | OUTPATIENT
Start: 2019-12-19 | End: 2020-01-23 | Stop reason: SDUPTHER

## 2019-12-19 NOTE — PROGRESS NOTES
"  Subjective   Murali Coyle is a 39 y.o. male is here today for medication management follow-up at the [facility], he presents to his appointment on time.  He presents with a caregiver from [facility].      Interactive complexity related to inability to communicate and must be accompanied by a provider.     Chief Complaint: Follow-up for agitation    History of Present Illness Staff reports that he has been doing well with no significant acting out spells, he will get anxious and irritable at times where he will \"yell out\" at both the day program as well as his group home.  He has been taking his medications as prescribed with no reported SE or issues.  Staff reports that he is sleepy today; no reports of problems with sleep.  He has not been excessively fatigued during the day, he tends to walk around at the program.  He has been eating well but has refused to have his weight monitored, staff states that they have not noticed any significant weight changes.  He had a cold about 2 weeks ago but no other issues reported.  Staff states that they have not observed any response to internal or external stimuli.  Denies any self-harm behaviors or harm to others.       The following portions of the patient's history were reviewed and updated as appropriate: allergies, current medications, past family history, past medical history, past social history, past surgical history and problem list.    Review of Systems   Constitutional: Negative for appetite change, chills, diaphoresis, fatigue, fever and unexpected weight change.   HENT: Negative for hearing loss, sore throat, trouble swallowing and voice change.    Eyes: Negative for photophobia and visual disturbance.   Respiratory: Negative for cough, chest tightness and shortness of breath.    Cardiovascular: Negative for chest pain and palpitations.   Gastrointestinal: Negative for abdominal pain, constipation, nausea and vomiting.   Endocrine: Negative for cold " "intolerance and heat intolerance.   Genitourinary: Negative for dysuria and frequency.   Musculoskeletal: Negative for arthralgias, back pain, joint swelling and neck stiffness.   Skin: Negative for color change and wound.   Allergic/Immunologic: Negative for environmental allergies and immunocompromised state.   Neurological: Negative for dizziness, tremors, seizures, syncope, weakness, light-headedness and headaches.   Hematological: Negative for adenopathy. Does not bruise/bleed easily.     Objective   Physical Exam   Constitutional: He appears well-nourished.   Neurological:   Unable to communicate      Height 170.2 cm (67.01\").  Refused     Medication List:   Current Outpatient Medications   Medication Sig Dispense Refill   • ammonium lactate (AMLACTIN) 12 % cream APPLY TO AFFECTED AREAS 2 TIMES A DAY AS NEEDED  5   • cetirizine (zyrTEC) 10 MG tablet Take  by mouth.     • clonazePAM (KlonoPIN) 0.5 MG tablet Take 1 tablet by mouth 3 (Three) Times a Day. 90 tablet 0   • dextromethorphan-quinidine (NUEDEXTA) 20-10 MG capsule capsule Take 1 capsule by mouth Every 12 (Twelve) Hours. 60 capsule 3   • DIAPER RASH 40 % ointment APPLY TO BOTTOM THREE TIMES DAILY AS NEEDED  5   • fluticasone (FLONASE) 50 MCG/ACT nasal spray SPRAY 2 SPRAYS IN EACH NOSTRIL ONCE DAILY FOR 14 DAYS  0   • MAPAP 325 MG tablet TAKE 2 TABLETS 2 TIMES A DAY AS NEEDED FOR HEADACHE,PAIN OR TEMP OVER 100.4 MAX 30 TABLETS FOR 30 DAYS  2   • mirtazapine (REMERON) 30 MG tablet Take 1 tablet by mouth Every Night. 30 tablet 3   • polyethylene glycol (MIRALAX) powder MIX 17GM IN 8OZ WATER OR JUICE AND DRINK DAILY  5   • risperiDONE (risperDAL) 0.5 MG tablet Take 1 tablet by mouth Every Morning. 30 tablet 3   • risperiDONE (risperDAL) 2 MG tablet Take 1 tablet by mouth at 8pm 30 tablet 3   • Skin Oils (ALPHA ALTA SHOWER & BATH) oil   5   • vitamin D (ERGOCALCIFEROL) 89497 units capsule capsule TAKE ONE CAPSULE BY MOUTH ONCE EVERY WEEK  0     No current " facility-administered medications for this visit.      Mental Status Exam:   Hygiene:   fair  Cooperation:  Guarded  Eye Contact:  Fair  Psychomotor Behavior:  Aggitated  Affect:  Incongruent  Hopelessness: Denies  Speech:  Limited  Thought Process:  blocked  Thought Content:  Unable to demonstrate  Suicidal:  No self injurous behavior reported  Homicidal:  No violent behavior toward others reported  Hallucinations:  No response noted to internal or external stimuli  Delusion:  Other Unable to assess  Memory:  Unable to evaluate  Orientation:  Person  Reliability:  poor  Insight:  None  Judgement:  Impaired  Impulse Control:  Impaired  Physical/Medical Issues:  No     Assessment/Plan   Problems Addressed this Visit     None      Visit Diagnoses     Intermittent explosive disorder in adult    -  Primary    Relevant Medications    dextromethorphan-quinidine (NUEDEXTA) 20-10 MG capsule capsule    clonazePAM (KlonoPIN) 0.5 MG tablet    mirtazapine (REMERON) 30 MG tablet    risperiDONE (risperDAL) 0.5 MG tablet    risperiDONE (risperDAL) 2 MG tablet    PBA (pseudobulbar affect)        Relevant Medications    dextromethorphan-quinidine (NUEDEXTA) 20-10 MG capsule capsule    History of traumatic head injury        Acquired intellectual disability        Relevant Medications    dextromethorphan-quinidine (NUEDEXTA) 20-10 MG capsule capsule    mirtazapine (REMERON) 30 MG tablet    risperiDONE (risperDAL) 0.5 MG tablet    risperiDONE (risperDAL) 2 MG tablet        Clonazepam 0.5mg tid prn for anxiety    Discussed medication options. Continue mirtazapine for sleep;  continue risperidone for impulse control, and clonazepam for anxiety.  Add nuedexta for mood/behaviors.  Reviewed the risks, benefits, and side effects of the medications; patient acknowledged and verbally consented.  Patient is agreeable to call the [facility].  Patient is aware to call 911 or go to the nearest ER should begin having SI/HI.     Prognosis:   Guarded dependent on medication, follow up appointment and treatment plan compliance     Functionality: Poor.  Patient unable to communicate and requires assistance to complete ADLs.      Treatment plan completed on 9/26/19    Return in 12 weeks

## 2020-01-17 DIAGNOSIS — F63.81 INTERMITTENT EXPLOSIVE DISORDER IN ADULT: ICD-10-CM

## 2020-01-20 DIAGNOSIS — F63.81 INTERMITTENT EXPLOSIVE DISORDER IN ADULT: ICD-10-CM

## 2020-01-20 RX ORDER — CLONAZEPAM 0.5 MG/1
0.5 TABLET ORAL 3 TIMES DAILY
Qty: 90 TABLET | Refills: 0 | Status: SHIPPED | OUTPATIENT
Start: 2020-01-20 | End: 2020-02-17 | Stop reason: SDUPTHER

## 2020-01-20 RX ORDER — CLONAZEPAM 0.5 MG/1
0.5 TABLET ORAL 3 TIMES DAILY
Qty: 90 TABLET | Refills: 0 | OUTPATIENT
Start: 2020-01-20

## 2020-01-23 ENCOUNTER — TELEPHONE (OUTPATIENT)
Dept: PSYCHIATRY | Facility: CLINIC | Age: 40
End: 2020-01-23

## 2020-01-23 RX ORDER — RISPERIDONE 2 MG/1
TABLET ORAL
Qty: 60 TABLET | Refills: 3 | Status: SHIPPED | OUTPATIENT
Start: 2020-01-23 | End: 2020-04-23

## 2020-01-23 NOTE — TELEPHONE ENCOUNTER
Karely from [facility] called stating that the patient has had an increase in aggression and anxiety. She states that patient has been hitting and yelling at staff. Please advise.

## 2020-01-23 NOTE — TELEPHONE ENCOUNTER
Karely states that they would be fine with patient having increased dose of Risperdal sent to [facility].

## 2020-01-23 NOTE — TELEPHONE ENCOUNTER
I can increase the risperidal to see if that may help; he may have an infection or something going on to that may be causing problems.  They may want to keep an eye that to.

## 2020-02-17 DIAGNOSIS — F63.81 INTERMITTENT EXPLOSIVE DISORDER IN ADULT: ICD-10-CM

## 2020-02-17 RX ORDER — CLONAZEPAM 0.5 MG/1
0.5 TABLET ORAL 3 TIMES DAILY
Qty: 90 TABLET | Refills: 0 | Status: SHIPPED | OUTPATIENT
Start: 2020-02-17 | End: 2020-03-24

## 2020-03-20 DIAGNOSIS — F63.81 INTERMITTENT EXPLOSIVE DISORDER IN ADULT: ICD-10-CM

## 2020-03-20 DIAGNOSIS — F48.2 PBA (PSEUDOBULBAR AFFECT): ICD-10-CM

## 2020-03-23 DIAGNOSIS — F63.81 INTERMITTENT EXPLOSIVE DISORDER IN ADULT: ICD-10-CM

## 2020-03-24 RX ORDER — CLONAZEPAM 0.5 MG/1
TABLET ORAL
Qty: 90 TABLET | Refills: 0 | Status: SHIPPED | OUTPATIENT
Start: 2020-03-24 | End: 2020-04-21

## 2020-03-24 RX ORDER — CLONAZEPAM 0.5 MG/1
0.5 TABLET ORAL 3 TIMES DAILY
Qty: 90 TABLET | Refills: 0 | OUTPATIENT
Start: 2020-03-24

## 2020-04-21 DIAGNOSIS — F63.81 INTERMITTENT EXPLOSIVE DISORDER IN ADULT: ICD-10-CM

## 2020-04-21 RX ORDER — CLONAZEPAM 0.5 MG/1
TABLET ORAL
Qty: 90 TABLET | Refills: 0 | Status: SHIPPED | OUTPATIENT
Start: 2020-04-21 | End: 2020-06-22 | Stop reason: SDUPTHER

## 2020-04-23 RX ORDER — RISPERIDONE 2 MG/1
TABLET ORAL
Qty: 60 TABLET | Refills: 3 | Status: SHIPPED | OUTPATIENT
Start: 2020-04-23 | End: 2020-07-07 | Stop reason: SDUPTHER

## 2020-04-23 RX ORDER — MIRTAZAPINE 30 MG/1
TABLET, FILM COATED ORAL
Qty: 30 TABLET | Refills: 3 | Status: SHIPPED | OUTPATIENT
Start: 2020-04-23 | End: 2020-07-07 | Stop reason: SDUPTHER

## 2020-04-27 RX ORDER — CLONAZEPAM 0.5 MG/1
0.5 TABLET ORAL 3 TIMES DAILY
Qty: 90 TABLET | Refills: 0 | Status: SHIPPED | OUTPATIENT
Start: 2020-04-27 | End: 2020-05-26 | Stop reason: SDUPTHER

## 2020-05-26 DIAGNOSIS — F63.81 INTERMITTENT EXPLOSIVE DISORDER IN ADULT: ICD-10-CM

## 2020-05-26 RX ORDER — CLONAZEPAM 0.5 MG/1
0.5 TABLET ORAL 3 TIMES DAILY
Qty: 90 TABLET | Refills: 0 | Status: SHIPPED | OUTPATIENT
Start: 2020-05-26 | End: 2020-07-07 | Stop reason: SDUPTHER

## 2020-06-22 DIAGNOSIS — F63.81 INTERMITTENT EXPLOSIVE DISORDER IN ADULT: ICD-10-CM

## 2020-06-22 RX ORDER — CLONAZEPAM 0.5 MG/1
0.5 TABLET ORAL 3 TIMES DAILY
Qty: 90 TABLET | Refills: 0 | Status: SHIPPED | OUTPATIENT
Start: 2020-06-22 | End: 2020-07-08 | Stop reason: SDUPTHER

## 2020-07-07 ENCOUNTER — TELEMEDICINE (OUTPATIENT)
Dept: PSYCHIATRY | Facility: CLINIC | Age: 40
End: 2020-07-07

## 2020-07-07 DIAGNOSIS — F48.2 PBA (PSEUDOBULBAR AFFECT): ICD-10-CM

## 2020-07-07 DIAGNOSIS — F79 ACQUIRED INTELLECTUAL DISABILITY: ICD-10-CM

## 2020-07-07 DIAGNOSIS — Z87.828 HISTORY OF TRAUMATIC HEAD INJURY: ICD-10-CM

## 2020-07-07 DIAGNOSIS — F63.81 INTERMITTENT EXPLOSIVE DISORDER IN ADULT: Primary | ICD-10-CM

## 2020-07-07 PROCEDURE — 99214 OFFICE O/P EST MOD 30 MIN: CPT | Performed by: NURSE PRACTITIONER

## 2020-07-07 RX ORDER — MIRTAZAPINE 30 MG/1
30 TABLET, FILM COATED ORAL
Qty: 30 TABLET | Refills: 3 | Status: SHIPPED | OUTPATIENT
Start: 2020-07-07 | End: 2020-10-27 | Stop reason: SDUPTHER

## 2020-07-07 RX ORDER — RISPERIDONE 2 MG/1
TABLET ORAL
Qty: 60 TABLET | Refills: 3 | Status: SHIPPED | OUTPATIENT
Start: 2020-07-07 | End: 2020-10-27 | Stop reason: SDUPTHER

## 2020-07-07 NOTE — PROGRESS NOTES
You have chosen to receive care through a telephone visit. Do you consent to use a telephone visit for your medical care today? Yes    This provider is located at [facility], [facility]al [facility] at [address] [place] [address]. The provider identified herself as well as her credentials.   The Patient is at home using his phone because problems with video connection and because of ID his case is discussed with Medical Coordinator Karely at [facility]. The patient's condition being diagnosed/treated is appropriate for telemedicine. The patient gave consent to be seen remotely, and when consent is given they understand that the consent allows for patient identifiable information to be sent to a third party as needed.   They may refuse to be seen remotely at any time. The electronic data is encrypted and password protected, and the patient has been advised of the potential risks to privacy not withstanding such measures    Subjective   Murali Coyle is a 40 y.o. male is being interviewed via Telephone as recommended per CDC guidelines associated with Corona Pandemic.    Interactive complexity related to inability to communicate and must be accompanied by a provider.     Chief Complaint: Follow-up for agitation    History of Present Illness  Medical Coordinator (MC)  reports that the patient has been doing really good especially with the change in the ADT program that has been effected by the COVID 19 virus.  Patient has been spending more time at his living home with a decrease in behavioral issues.  MC states that he has been taking his medications as prescribed with no SE or problems noted.  He has had no anger or agitation outbursts reported.  He is sleeping an average of about 12 hours per night with no NM reported. He may take a short nap during the day but most of the time they try to keep him visit.  Appetite has been good with no weight changes reported.  MC reports that he has had bouts of  pancreatitis that have caused issues with diarrhea and vomiting but not other health issues reported.  No current stressors reported and seems to enjoy time spending at home.   reports no response to internal/external stimuli noted, denies any self harming behaviors.    The following portions of the patient's history were reviewed and updated as appropriate: allergies, current medications, past family history, past medical history, past social history, past surgical history and problem list.    Review of Systems   Constitutional: Negative for appetite change, chills, diaphoresis, fatigue, fever and unexpected weight change.   HENT: Negative for hearing loss, sore throat, trouble swallowing and voice change.    Eyes: Negative for photophobia and visual disturbance.   Respiratory: Negative for cough, chest tightness and shortness of breath.    Cardiovascular: Negative for chest pain and palpitations.   Gastrointestinal: Negative for abdominal pain, constipation, nausea and vomiting.   Endocrine: Negative for cold intolerance and heat intolerance.   Genitourinary: Negative for dysuria and frequency.   Musculoskeletal: Negative for arthralgias, back pain, joint swelling and neck stiffness.   Skin: Negative for color change and wound.   Allergic/Immunologic: Negative for environmental allergies and immunocompromised state.   Neurological: Negative for dizziness, tremors, seizures, syncope, weakness, light-headedness and headaches.   Hematological: Negative for adenopathy. Does not bruise/bleed easily.     Objective   Physical Exam   Constitutional: He appears well-nourished.   Neurological:   Unable to communicate      There were no vitals taken for this visit.      Medication List:   Current Outpatient Medications   Medication Sig Dispense Refill   • ammonium lactate (AMLACTIN) 12 % cream APPLY TO AFFECTED AREAS 2 TIMES A DAY AS NEEDED  5   • cetirizine (zyrTEC) 10 MG tablet Take  by mouth.     • clonazePAM (KlonoPIN)  0.5 MG tablet Take 1 tablet by mouth 3 (Three) Times a Day. 90 tablet 0   • dextromethorphan-quinidine (Nuedexta) 20-10 MG capsule capsule Take 1 capsule by mouth Every 12 (Twelve) Hours. 60 capsule 3   • DIAPER RASH 40 % ointment APPLY TO BOTTOM THREE TIMES DAILY AS NEEDED  5   • fluticasone (FLONASE) 50 MCG/ACT nasal spray SPRAY 2 SPRAYS IN EACH NOSTRIL ONCE DAILY FOR 14 DAYS  0   • MAPAP 325 MG tablet TAKE 2 TABLETS 2 TIMES A DAY AS NEEDED FOR HEADACHE,PAIN OR TEMP OVER 100.4 MAX 30 TABLETS FOR 30 DAYS  2   • mirtazapine (REMERON) 30 MG tablet Take 1 tablet by mouth every night at bedtime. 30 tablet 3   • polyethylene glycol (MIRALAX) powder MIX 17GM IN 8OZ WATER OR JUICE AND DRINK DAILY  5   • risperiDONE (risperDAL) 2 MG tablet TAKE ONE TABLET EVERY MORNING AND AT 8:00PM 60 tablet 3   • Skin Oils (ALPHA ALTA SHOWER & BATH) oil   5   • vitamin D (ERGOCALCIFEROL) 98378 units capsule capsule TAKE ONE CAPSULE BY MOUTH ONCE EVERY WEEK  0     No current facility-administered medications for this visit.      Mental Status Exam:   Hygiene:   Unable to assess  Cooperation:  Guarded  Eye Contact:  unable to assess  Psychomotor Behavior:  unable to assess  Affect:  unable to assess  Hopelessness: Denies  Speech:  Limited  Thought Process:  blocked  Thought Content:  Unable to demonstrate  Suicidal:  No self injurous behavior reported  Homicidal:  No violent behavior toward others reported  Hallucinations:  No response noted to internal or external stimuli  Delusion:  Other Unable to assess  Memory:  Unable to evaluate  Orientation:  Person  Reliability:  poor  Insight:  None  Judgement:  Impaired  Impulse Control:  Impaired  Physical/Medical Issues:  No     Assessment/Plan   Problems Addressed this Visit     None      Visit Diagnoses     Intermittent explosive disorder in adult    -  Primary    Relevant Medications    dextromethorphan-quinidine (Nuedexta) 20-10 MG capsule capsule    mirtazapine (REMERON) 30 MG tablet     risperiDONE (risperDAL) 2 MG tablet    PBA (pseudobulbar affect)        Relevant Medications    dextromethorphan-quinidine (Nuedexta) 20-10 MG capsule capsule    History of traumatic head injury        Acquired intellectual disability        Relevant Medications    dextromethorphan-quinidine (Nuedexta) 20-10 MG capsule capsule    mirtazapine (REMERON) 30 MG tablet    risperiDONE (risperDAL) 2 MG tablet        Clonazepam 0.5mg tid prn for anxiety (unable to send at this time related to phone malfunction)    Discussed medication options. Continue mirtazapine for sleep;  continue risperidone for impulse control, and clonazepam for anxiety.  Continue nuedexta for mood/behaviors.  Reviewed the risks, benefits, and side effects of the medications; patient acknowledged and verbally consented.  Patient/Staff is agreeable to call the [facility] with any worsening of symptoms.  Patient is aware to call 911 or go to the nearest ER should begin having SI/HI.     Prognosis:  Guarded dependent on medication, follow up appointment and treatment plan compliance     Functionality: Poor.  Patient unable to communicate and requires assistance to complete ADLs.      Treatment plan completed on 9/26/19    Return in 16 weeks

## 2020-07-08 RX ORDER — CLONAZEPAM 0.5 MG/1
0.5 TABLET ORAL 3 TIMES DAILY
Qty: 90 TABLET | Refills: 0 | Status: SHIPPED | OUTPATIENT
Start: 2020-07-08 | End: 2020-08-04 | Stop reason: SDUPTHER

## 2020-07-31 ENCOUNTER — TELEPHONE (OUTPATIENT)
Dept: GASTROENTEROLOGY | Facility: CLINIC | Age: 40
End: 2020-07-31

## 2020-07-31 ENCOUNTER — OFFICE VISIT (OUTPATIENT)
Dept: GASTROENTEROLOGY | Facility: CLINIC | Age: 40
End: 2020-07-31

## 2020-07-31 VITALS
WEIGHT: 187.6 LBS | HEART RATE: 81 BPM | DIASTOLIC BLOOD PRESSURE: 101 MMHG | HEIGHT: 67 IN | TEMPERATURE: 97.3 F | SYSTOLIC BLOOD PRESSURE: 137 MMHG | BODY MASS INDEX: 29.44 KG/M2

## 2020-07-31 DIAGNOSIS — R11.10 NON-INTRACTABLE VOMITING, PRESENCE OF NAUSEA NOT SPECIFIED, UNSPECIFIED VOMITING TYPE: ICD-10-CM

## 2020-07-31 DIAGNOSIS — R10.13 EPIGASTRIC PAIN: Primary | ICD-10-CM

## 2020-07-31 PROCEDURE — 99204 OFFICE O/P NEW MOD 45 MIN: CPT | Performed by: NURSE PRACTITIONER

## 2020-07-31 RX ORDER — PROMETHAZINE HYDROCHLORIDE 25 MG/1
1 TABLET ORAL AS NEEDED
COMMUNITY
Start: 2020-05-20

## 2020-07-31 RX ORDER — PANTOPRAZOLE SODIUM 20 MG/1
20 TABLET, DELAYED RELEASE ORAL DAILY
COMMUNITY

## 2020-08-03 DIAGNOSIS — R11.10 VOMITING, INTRACTABILITY OF VOMITING NOT SPECIFIED, PRESENCE OF NAUSEA NOT SPECIFIED, UNSPECIFIED VOMITING TYPE: Primary | ICD-10-CM

## 2020-08-04 DIAGNOSIS — F63.81 INTERMITTENT EXPLOSIVE DISORDER IN ADULT: ICD-10-CM

## 2020-08-04 DIAGNOSIS — R11.10 NON-INTRACTABLE VOMITING, PRESENCE OF NAUSEA NOT SPECIFIED, UNSPECIFIED VOMITING TYPE: ICD-10-CM

## 2020-08-04 DIAGNOSIS — R11.10 VOMITING, INTRACTABILITY OF VOMITING NOT SPECIFIED, PRESENCE OF NAUSEA NOT SPECIFIED, UNSPECIFIED VOMITING TYPE: ICD-10-CM

## 2020-08-04 DIAGNOSIS — Z01.818 PREOP TESTING: Primary | ICD-10-CM

## 2020-08-04 DIAGNOSIS — R10.13 EPIGASTRIC PAIN: ICD-10-CM

## 2020-08-04 RX ORDER — CLONAZEPAM 0.5 MG/1
0.5 TABLET ORAL 3 TIMES DAILY
Qty: 90 TABLET | Refills: 0 | Status: SHIPPED | OUTPATIENT
Start: 2020-08-04 | End: 2020-09-02 | Stop reason: SDUPTHER

## 2020-08-14 ENCOUNTER — LAB (OUTPATIENT)
Dept: LAB | Facility: HOSPITAL | Age: 40
End: 2020-08-14

## 2020-08-14 ENCOUNTER — APPOINTMENT (OUTPATIENT)
Dept: LAB | Facility: HOSPITAL | Age: 40
End: 2020-08-14

## 2020-08-14 DIAGNOSIS — R11.10 NON-INTRACTABLE VOMITING, PRESENCE OF NAUSEA NOT SPECIFIED, UNSPECIFIED VOMITING TYPE: ICD-10-CM

## 2020-08-14 DIAGNOSIS — R10.13 EPIGASTRIC PAIN: ICD-10-CM

## 2020-08-14 DIAGNOSIS — Z01.818 PREOP TESTING: ICD-10-CM

## 2020-08-14 DIAGNOSIS — R11.10 VOMITING, INTRACTABILITY OF VOMITING NOT SPECIFIED, PRESENCE OF NAUSEA NOT SPECIFIED, UNSPECIFIED VOMITING TYPE: ICD-10-CM

## 2020-08-14 PROCEDURE — C9803 HOPD COVID-19 SPEC COLLECT: HCPCS

## 2020-08-14 PROCEDURE — U0004 COV-19 TEST NON-CDC HGH THRU: HCPCS

## 2020-08-14 PROCEDURE — U0002 COVID-19 LAB TEST NON-CDC: HCPCS

## 2020-08-17 ENCOUNTER — ANESTHESIA EVENT (OUTPATIENT)
Dept: PERIOP | Facility: HOSPITAL | Age: 40
End: 2020-08-17

## 2020-08-17 ENCOUNTER — ANESTHESIA (OUTPATIENT)
Dept: PERIOP | Facility: HOSPITAL | Age: 40
End: 2020-08-17

## 2020-08-17 ENCOUNTER — HOSPITAL ENCOUNTER (OUTPATIENT)
Facility: HOSPITAL | Age: 40
Setting detail: HOSPITAL OUTPATIENT SURGERY
Discharge: NURSING, REHAB OR LONG-TERM CARE | End: 2020-08-17
Attending: INTERNAL MEDICINE | Admitting: INTERNAL MEDICINE

## 2020-08-17 VITALS
RESPIRATION RATE: 20 BRPM | OXYGEN SATURATION: 100 % | HEIGHT: 67 IN | WEIGHT: 179 LBS | DIASTOLIC BLOOD PRESSURE: 86 MMHG | TEMPERATURE: 98 F | SYSTOLIC BLOOD PRESSURE: 136 MMHG | HEART RATE: 64 BPM | BODY MASS INDEX: 28.09 KG/M2

## 2020-08-17 DIAGNOSIS — R11.10 VOMITING, INTRACTABILITY OF VOMITING NOT SPECIFIED, PRESENCE OF NAUSEA NOT SPECIFIED, UNSPECIFIED VOMITING TYPE: ICD-10-CM

## 2020-08-17 LAB
REF LAB TEST METHOD: NORMAL
SARS-COV-2 RNA RESP QL NAA+PROBE: NOT DETECTED

## 2020-08-17 PROCEDURE — 43239 EGD BIOPSY SINGLE/MULTIPLE: CPT | Performed by: INTERNAL MEDICINE

## 2020-08-17 PROCEDURE — 25010000002 PROPOFOL 10 MG/ML EMULSION: Performed by: NURSE ANESTHETIST, CERTIFIED REGISTERED

## 2020-08-17 RX ORDER — FENTANYL CITRATE 50 UG/ML
50 INJECTION, SOLUTION INTRAMUSCULAR; INTRAVENOUS
Status: DISCONTINUED | OUTPATIENT
Start: 2020-08-17 | End: 2020-08-17 | Stop reason: HOSPADM

## 2020-08-17 RX ORDER — PROPOFOL 10 MG/ML
VIAL (ML) INTRAVENOUS CONTINUOUS PRN
Status: DISCONTINUED | OUTPATIENT
Start: 2020-08-17 | End: 2020-08-17 | Stop reason: SURG

## 2020-08-17 RX ORDER — ONDANSETRON 2 MG/ML
4 INJECTION INTRAMUSCULAR; INTRAVENOUS AS NEEDED
Status: DISCONTINUED | OUTPATIENT
Start: 2020-08-17 | End: 2020-08-17 | Stop reason: HOSPADM

## 2020-08-17 RX ORDER — MEPERIDINE HYDROCHLORIDE 25 MG/ML
12.5 INJECTION INTRAMUSCULAR; INTRAVENOUS; SUBCUTANEOUS
Status: DISCONTINUED | OUTPATIENT
Start: 2020-08-17 | End: 2020-08-17 | Stop reason: HOSPADM

## 2020-08-17 RX ORDER — SODIUM CHLORIDE, SODIUM LACTATE, POTASSIUM CHLORIDE, CALCIUM CHLORIDE 600; 310; 30; 20 MG/100ML; MG/100ML; MG/100ML; MG/100ML
INJECTION, SOLUTION INTRAVENOUS CONTINUOUS PRN
Status: DISCONTINUED | OUTPATIENT
Start: 2020-08-17 | End: 2020-08-17 | Stop reason: SURG

## 2020-08-17 RX ORDER — IPRATROPIUM BROMIDE AND ALBUTEROL SULFATE 2.5; .5 MG/3ML; MG/3ML
3 SOLUTION RESPIRATORY (INHALATION) ONCE AS NEEDED
Status: DISCONTINUED | OUTPATIENT
Start: 2020-08-17 | End: 2020-08-17 | Stop reason: HOSPADM

## 2020-08-17 RX ORDER — OXYCODONE HYDROCHLORIDE AND ACETAMINOPHEN 5; 325 MG/1; MG/1
1 TABLET ORAL ONCE AS NEEDED
Status: DISCONTINUED | OUTPATIENT
Start: 2020-08-17 | End: 2020-08-17 | Stop reason: HOSPADM

## 2020-08-17 RX ORDER — OMEPRAZOLE 40 MG/1
40 CAPSULE, DELAYED RELEASE ORAL
Qty: 60 CAPSULE | Refills: 11 | Status: SHIPPED | OUTPATIENT
Start: 2020-08-17

## 2020-08-17 RX ADMIN — PROPOFOL 100 MCG/KG/MIN: 10 INJECTION, EMULSION INTRAVENOUS at 14:02

## 2020-08-17 RX ADMIN — SODIUM CHLORIDE, POTASSIUM CHLORIDE, SODIUM LACTATE AND CALCIUM CHLORIDE: 600; 310; 30; 20 INJECTION, SOLUTION INTRAVENOUS at 14:03

## 2020-08-17 NOTE — OP NOTE
ESOPHAGOGASTRODUODENOSCOPY PROCEDURE REPORT    Murali Coyle  8/17/2020    GASTROENTEROLOGIST:  Cristian Rivera MD    PRE-PROCEDURE DIAGNOSIS:  Vomiting, intractability of vomiting not specified, presence of nausea not specified, unspecified vomiting type [R11.10]    POST-PROCEDURE DIAGNOSIS:  Erosive esophagitis distal esophagus with ulceration.  Normal stomach  Normal duodenum    INDICATION:  Abdominal pain  Recurrent vomiting    Procedure(s):  ESOPHAGOGASTRODUODENOSCOPY WITH BIOPSY CPT CODE: 27223    ANESTHESIA:  Propofol administered by anesthesia.  See anesthesia notes for ASA classification    STAFF:  Circulator: Ivonne Parks RN  Endo Technician: Shlomo Norman    FINDINGS:  As noted in the post procedure diagnosis    OPERATIVE PROCEDURE:  After proper informed consent was obtained, patient was transferred to the OR/endoscopy suite.  Patient was then placed in left lateral decubitus position. The Olympus 180 series video gastroscope was inserted orally under direct visualization.  Esophagus, stomach, and duodenum were inspected.  The endoscope was passed to the third portion of the duodenum.  Scope was retroflexed for visualization of the cardia and incisuraThe endoscope was then withdrawn. Patient tolerated the procedure well. There were no immediate complications.    ESTIMATED BLOOD LOSS:  None    SPECIMENS:  Distal esophageal biopsies pending rule out short segment Banks's esophagus               COMPLICATIONS;  None    RECOMMENDATIONS/ PLAN:  Await pathology report  Initiate high-dose proton pump inhibitor therapy such as omeprazole 40 mg p.o. twice daily or equivalent    Cristian Rivera MD     08/17/20 2:09 PM

## 2020-08-17 NOTE — ANESTHESIA PREPROCEDURE EVALUATION
Anesthesia Evaluation     Patient summary reviewed and Nursing notes reviewed   NPO Solid Status: > 8 hours  NPO Liquid Status: > 8 hours           Airway   Mallampati: I  TM distance: >3 FB  Neck ROM: full  No difficulty expected  Dental - normal exam     Pulmonary - negative pulmonary ROS and normal exam    breath sounds clear to auscultation  Cardiovascular - negative cardio ROS and normal exam  Exercise tolerance: good (4-7 METS)    NYHA Classification: II  Rhythm: regular  Rate: normal        Neuro/Psych  (+) psychiatric history,     GI/Hepatic/Renal/Endo - negative ROS     Musculoskeletal (-) negative ROS    Abdominal  - normal exam    Bowel sounds: normal.   Substance History - negative use     OB/GYN negative ob/gyn ROS         Other                        Anesthesia Plan    ASA 2     general       Anesthetic plan, all risks, benefits, and alternatives have been provided, discussed and informed consent has been obtained with: patient.  Use of blood products discussed with patient .   Plan discussed with attending.

## 2020-08-17 NOTE — DISCHARGE INSTRUCTIONS
General Anesthesia, Adult, Care After  This sheet gives you information about how to care for yourself after your procedure. Your health care provider may also give you more specific instructions. If you have problems or questions, contact your health care provider.  What can I expect after the procedure?  After the procedure, the following side effects are common:  · Pain or discomfort at the IV site.  · Nausea.  · Vomiting.  · Sore throat.  · Trouble concentrating.  · Feeling cold or chills.  · Weak or tired.  · Sleepiness and fatigue.  · Soreness and body aches. These side effects can affect parts of the body that were not involved in surgery.  Follow these instructions at home:    For at least 24 hours after the procedure:  · Have a responsible adult stay with you. It is important to have someone help care for you until you are awake and alert.  · Rest as needed.  · Do not:  ? Participate in activities in which you could fall or become injured.  ? Drive.  ? Use heavy machinery.  ? Drink alcohol.  ? Take sleeping pills or medicines that cause drowsiness.  ? Make important decisions or sign legal documents.  ? Take care of children on your own.  Eating and drinking  · Follow any instructions from your health care provider about eating or drinking restrictions.  · When you feel hungry, start by eating small amounts of foods that are soft and easy to digest (bland), such as toast. Gradually return to your regular diet.  · Drink enough fluid to keep your urine pale yellow.  · If you vomit, rehydrate by drinking water, juice, or clear broth.  General instructions  · If you have sleep apnea, surgery and certain medicines can increase your risk for breathing problems. Follow instructions from your health care provider about wearing your sleep device:  ? Anytime you are sleeping, including during daytime naps.  ? While taking prescription pain medicines, sleeping medicines, or medicines that make you drowsy.  · Return to  your normal activities as told by your health care provider. Ask your health care provider what activities are safe for you.  · Take over-the-counter and prescription medicines only as told by your health care provider.  · If you smoke, do not smoke without supervision.  · Keep all follow-up visits as told by your health care provider. This is important.  Contact a health care provider if:  · You have nausea or vomiting that does not get better with medicine.  · You cannot eat or drink without vomiting.  · You have pain that does not get better with medicine.  · You are unable to pass urine.  · You develop a skin rash.  · You have a fever.  · You have redness around your IV site that gets worse.  Get help right away if:  · You have difficulty breathing.  · You have chest pain.  · You have blood in your urine or stool, or you vomit blood.  Summary  · After the procedure, it is common to have a sore throat or nausea. It is also common to feel tired.  · Have a responsible adult stay with you for the first 24 hours after general anesthesia. It is important to have someone help care for you until you are awake and alert.  · When you feel hungry, start by eating small amounts of foods that are soft and easy to digest (bland), such as toast. Gradually return to your regular diet.  · Drink enough fluid to keep your urine pale yellow.  · Return to your normal activities as told by your health care provider. Ask your health care provider what activities are safe for you.  This information is not intended to replace advice given to you by your health care provider. Make sure you discuss any questions you have with your health care provider.  Document Released: 03/26/2002 Document Revised: 12/21/2018 Document Reviewed: 08/03/2018  Elsevier Patient Education © 2020 [employer] Inc[employer]  Upper Endoscopy, Adult, Care After  This sheet gives you information about how to care for yourself after your procedure. Your health care provider may  also give you more specific instructions. If you have problems or questions, contact your health care provider.  What can I expect after the procedure?  After the procedure, it is common to have:  · A sore throat.  · Mild stomach pain or discomfort.  · Bloating.  · Nausea.  Follow these instructions at home:    · Follow instructions from your health care provider about what to eat or drink after your procedure.  · Return to your normal activities as told by your health care provider. Ask your health care provider what activities are safe for you.  · Take over-the-counter and prescription medicines only as told by your health care provider.  · Do not drive for 24 hours if you were given a sedative during your procedure.  · Keep all follow-up visits as told by your health care provider. This is important.  Contact a health care provider if you have:  · A sore throat that lasts longer than one day.  · Trouble swallowing.  Get help right away if:  · You vomit blood or your vomit looks like coffee grounds.  · You have:  ? A fever.  ? Bloody, black, or tarry stools.  ? A severe sore throat or you cannot swallow.  ? Difficulty breathing.  ? Severe pain in your chest or abdomen.  Summary  · After the procedure, it is common to have a sore throat, mild stomach discomfort, bloating, and nausea.  · Do not drive for 24 hours if you were given a sedative during the procedure.  · Follow instructions from your health care provider about what to eat or drink after your procedure.  · Return to your normal activities as told by your health care provider.  This information is not intended to replace advice given to you by your health care provider. Make sure you discuss any questions you have with your health care provider.  Document Released: 06/18/2013 Document Revised: 06/11/2019 Document Reviewed: 05/20/2019  [email or web address] Patient Education © 2020 Elsevier Inc.

## 2020-08-19 LAB
LAB AP CASE REPORT: NORMAL
PATH REPORT.FINAL DX SPEC: NORMAL

## 2020-09-02 DIAGNOSIS — F63.81 INTERMITTENT EXPLOSIVE DISORDER IN ADULT: ICD-10-CM

## 2020-09-02 RX ORDER — CLONAZEPAM 0.5 MG/1
0.5 TABLET ORAL 3 TIMES DAILY
Qty: 90 TABLET | Refills: 0 | Status: SHIPPED | OUTPATIENT
Start: 2020-09-02 | End: 2020-10-07 | Stop reason: SDUPTHER

## 2020-10-07 DIAGNOSIS — F63.81 INTERMITTENT EXPLOSIVE DISORDER IN ADULT: ICD-10-CM

## 2020-10-07 RX ORDER — CLONAZEPAM 0.5 MG/1
0.5 TABLET ORAL 3 TIMES DAILY
Qty: 90 TABLET | Refills: 0 | Status: SHIPPED | OUTPATIENT
Start: 2020-10-07 | End: 2020-10-27 | Stop reason: SDUPTHER

## 2020-10-27 ENCOUNTER — TELEMEDICINE (OUTPATIENT)
Dept: PSYCHIATRY | Facility: CLINIC | Age: 40
End: 2020-10-27

## 2020-10-27 DIAGNOSIS — F48.2 PBA (PSEUDOBULBAR AFFECT): ICD-10-CM

## 2020-10-27 DIAGNOSIS — Z87.828 HISTORY OF TRAUMATIC HEAD INJURY: ICD-10-CM

## 2020-10-27 DIAGNOSIS — F63.81 INTERMITTENT EXPLOSIVE DISORDER IN ADULT: Primary | ICD-10-CM

## 2020-10-27 PROCEDURE — 99214 OFFICE O/P EST MOD 30 MIN: CPT | Performed by: NURSE PRACTITIONER

## 2020-10-27 RX ORDER — MIRTAZAPINE 30 MG/1
30 TABLET, FILM COATED ORAL
Qty: 30 TABLET | Refills: 3 | Status: SHIPPED | OUTPATIENT
Start: 2020-10-27 | End: 2021-01-21 | Stop reason: SDUPTHER

## 2020-10-27 RX ORDER — RISPERIDONE 2 MG/1
TABLET ORAL
Qty: 60 TABLET | Refills: 3 | Status: SHIPPED | OUTPATIENT
Start: 2020-10-27 | End: 2021-01-21 | Stop reason: SDUPTHER

## 2020-10-27 RX ORDER — CLONAZEPAM 0.5 MG/1
TABLET ORAL
Qty: 75 TABLET | Refills: 0 | Status: SHIPPED | OUTPATIENT
Start: 2020-10-27 | End: 2020-11-23 | Stop reason: SDUPTHER

## 2020-10-27 NOTE — PROGRESS NOTES
You have chosen to receive care through a telephone visit. Do you consent to use a telephone visit for your medical care today? Yes    This provider is located at [facility] at [address] [place] [address]. The provider identified herself as well as her credentials.   The Patient is at home using his phone because problems with video connection and because of ID his case is discussed with Medical Coordinator Karely at [facility]. The patient's condition being diagnosed/treated is appropriate for telemedicine. The patient gave consent to be seen remotely, and when consent is given they understand that the consent allows for patient identifiable information to be sent to a third party as needed.   They may refuse to be seen remotely at any time. The electronic data is encrypted and password protected, and the patient has been advised of the potential risks to privacy not withstanding such measures    Subjective   Murali Coyle is a 40 y.o. male is being interviewed via Telephone as recommended per CDC guidelines associated with Corona Pandemic.    Interactive complexity related to inability to communicate and must be accompanied by a provider.     Chief Complaint: Follow-up for agitation    History of Present Illness  Medical Coordinator (MC)  reports that the patient has been doing really well, she states that after his afternoon dose of the clonazepam he tends to drool more which could be associated with the lack on interactions at ADT.  Therefore, will decrease the afternoon dose of the clonazepam.  He was seen recently he was diagnosed with severe acid reflux and his diet is being modified, appetite has been good with no significant weight changes reported.  .  MC reports no acting out spells and he has been less anxious.  Reports that he has been sleeping well, about 8-10 hours per night with no reported NM.   Denies any new health issues.  Denies any response to internal  or external stimuli.      The following portions of the patient's history were reviewed and updated as appropriate: allergies, current medications, past family history, past medical history, past social history, past surgical history and problem list.    Review of Systems   Constitutional: Negative for appetite change, chills, diaphoresis, fatigue, fever and unexpected weight change.   HENT: Negative for hearing loss, sore throat, trouble swallowing and voice change.    Eyes: Negative for photophobia and visual disturbance.   Respiratory: Negative for cough, chest tightness and shortness of breath.    Cardiovascular: Negative for chest pain and palpitations.   Gastrointestinal: Negative for abdominal pain, constipation, nausea and vomiting.   Endocrine: Negative for cold intolerance and heat intolerance.   Genitourinary: Negative for dysuria and frequency.   Musculoskeletal: Negative for arthralgias, back pain, joint swelling and neck stiffness.   Skin: Negative for color change and wound.   Allergic/Immunologic: Negative for environmental allergies and immunocompromised state.   Neurological: Negative for dizziness, tremors, seizures, syncope, weakness, light-headedness and headaches.   Hematological: Negative for adenopathy. Does not bruise/bleed easily.     Objective   Physical Exam   Neurological:   Unable to communicate      There were no vitals taken for this visit.      Medication List:   Current Outpatient Medications   Medication Sig Dispense Refill   • ammonium lactate (AMLACTIN) 12 % cream APPLY TO AFFECTED AREAS 2 TIMES A DAY AS NEEDED  5   • cetirizine (zyrTEC) 10 MG tablet Take  by mouth.     • clonazePAM (KlonoPIN) 0.5 MG tablet Take one (1) tablet by mouth every morning and evening, and take 1/2 tablet every afternoon at 2pm 75 tablet 0   • dextromethorphan-quinidine (Nuedexta) 20-10 MG capsule capsule Take 1 capsule by mouth Every 12 (Twelve) Hours. 60 capsule 3   • DIAPER RASH 40 % ointment APPLY  TO BOTTOM THREE TIMES DAILY AS NEEDED  5   • fluticasone (FLONASE) 50 MCG/ACT nasal spray SPRAY 2 SPRAYS IN EACH NOSTRIL ONCE DAILY FOR 14 DAYS  0   • MAPAP 325 MG tablet TAKE 2 TABLETS 2 TIMES A DAY AS NEEDED FOR HEADACHE,PAIN OR TEMP OVER 100.4 MAX 30 TABLETS FOR 30 DAYS  2   • mirtazapine (REMERON) 30 MG tablet Take 1 tablet by mouth every night at bedtime. 30 tablet 3   • omeprazole (priLOSEC) 40 MG capsule Take 1 capsule by mouth 2 (Two) Times a Day Before Meals. Take a half hour before breakfast 60 capsule 11   • pantoprazole (PROTONIX) 20 MG EC tablet Take 20 mg by mouth Daily.     • polyethylene glycol (MIRALAX) powder MIX 17GM IN 8OZ WATER OR JUICE AND DRINK DAILY  5   • promethazine (PHENERGAN) 25 MG tablet Take 1 tablet by mouth As Needed.     • risperiDONE (risperDAL) 2 MG tablet TAKE ONE TABLET EVERY MORNING AND AT 8:00PM 60 tablet 3   • Skin Oils (ALPHA ALTA SHOWER & BATH) oil   5   • vitamin D (ERGOCALCIFEROL) 43738 units capsule capsule TAKE ONE CAPSULE BY MOUTH ONCE EVERY WEEK  0     No current facility-administered medications for this visit.      Mental Status Exam:   Hygiene:   Unable to assess  Cooperation:  Guarded  Eye Contact:  unable to assess  Psychomotor Behavior:  unable to assess  Affect:  unable to assess  Hopelessness: Denies  Speech:  Limited  Thought Process:  blocked  Thought Content:  Unable to demonstrate  Suicidal:  No self injurous behavior reported  Homicidal:  No violent behavior toward others reported  Hallucinations:  No response noted to internal or external stimuli  Delusion:  Other Unable to assess  Memory:  Unable to evaluate  Orientation:  Person  Reliability:  poor  Insight:  None  Judgement:  Impaired  Impulse Control:  Impaired  Physical/Medical Issues:  No     Assessment/Plan   Problems Addressed this Visit     None      Visit Diagnoses     Intermittent explosive disorder in adult    -  Primary    Relevant Medications    clonazePAM (KlonoPIN) 0.5 MG tablet     mirtazapine (REMERON) 30 MG tablet    risperiDONE (risperDAL) 2 MG tablet    PBA (pseudobulbar affect)        History of traumatic head injury          Diagnoses       Codes Comments    Intermittent explosive disorder in adult    -  Primary ICD-10-CM: F63.81  ICD-9-CM: 312.34     PBA (pseudobulbar affect)     ICD-10-CM: F48.2  ICD-9-CM: 310.81     History of traumatic head injury     ICD-10-CM: Z87.828  ICD-9-CM: V15.59         Clonazepam 0.5mg bid- take 1/2 tab at 2pm.      Discussed medication options. Continue mirtazapine for sleep;  continue risperidone for impulse control, and slightly decrease clonazepam for anxiety.  Continue nuedexta for mood/behaviors.  Reviewed the risks, benefits, and side effects of the medications; patient acknowledged and verbally consented.  Patient/Staff is agreeable to call the [facility] with any worsening of symptoms.  Patient is aware to call 911 or go to the nearest ER should begin having SI/HI.     Prognosis:  Guarded dependent on medication, follow up appointment and treatment plan compliance     Functionality: Poor.  Patient unable to communicate and requires assistance to complete ADLs.      Treatment plan completed on 10/27/2020    Return in 12 weeks

## 2020-11-06 DIAGNOSIS — F48.2 PBA (PSEUDOBULBAR AFFECT): ICD-10-CM

## 2020-11-10 RX ORDER — DEXTROMETHORPHAN HYDROBROMIDE AND QUINIDINE SULFATE 20; 10 MG/1; MG/1
CAPSULE, GELATIN COATED ORAL
Qty: 56 CAPSULE | Refills: 3 | Status: SHIPPED | OUTPATIENT
Start: 2020-11-10 | End: 2021-01-21 | Stop reason: SDUPTHER

## 2020-11-23 DIAGNOSIS — F63.81 INTERMITTENT EXPLOSIVE DISORDER IN ADULT: ICD-10-CM

## 2020-11-23 RX ORDER — CLONAZEPAM 0.5 MG/1
TABLET ORAL
Qty: 75 TABLET | Refills: 0 | Status: SHIPPED | OUTPATIENT
Start: 2020-11-23 | End: 2020-12-21 | Stop reason: SDUPTHER

## 2020-12-21 DIAGNOSIS — F63.81 INTERMITTENT EXPLOSIVE DISORDER IN ADULT: ICD-10-CM

## 2020-12-22 RX ORDER — CLONAZEPAM 0.5 MG/1
TABLET ORAL
Qty: 75 TABLET | Refills: 0 | Status: SHIPPED | OUTPATIENT
Start: 2020-12-22 | End: 2021-01-07 | Stop reason: SDUPTHER

## 2021-01-01 ENCOUNTER — TELEMEDICINE (OUTPATIENT)
Dept: PSYCHIATRY | Facility: CLINIC | Age: 41
End: 2021-01-01

## 2021-01-01 DIAGNOSIS — F79 ACQUIRED INTELLECTUAL DISABILITY: ICD-10-CM

## 2021-01-01 DIAGNOSIS — F63.81 INTERMITTENT EXPLOSIVE DISORDER IN ADULT: Primary | ICD-10-CM

## 2021-01-01 DIAGNOSIS — F48.2 PBA (PSEUDOBULBAR AFFECT): ICD-10-CM

## 2021-01-01 DIAGNOSIS — F63.81 INTERMITTENT EXPLOSIVE DISORDER IN ADULT: ICD-10-CM

## 2021-01-01 DIAGNOSIS — Z87.828 HISTORY OF TRAUMATIC HEAD INJURY: ICD-10-CM

## 2021-01-01 PROCEDURE — 99214 OFFICE O/P EST MOD 30 MIN: CPT | Performed by: NURSE PRACTITIONER

## 2021-01-01 RX ORDER — CLONAZEPAM 0.5 MG/1
TABLET ORAL
Qty: 75 TABLET | Refills: 0 | Status: SHIPPED | OUTPATIENT
Start: 2021-01-01 | End: 2021-01-01 | Stop reason: SDUPTHER

## 2021-01-01 RX ORDER — DEXTROMETHORPHAN HYDROBROMIDE AND QUINIDINE SULFATE 20; 10 MG/1; MG/1
1 CAPSULE, GELATIN COATED ORAL EVERY 12 HOURS
Qty: 56 CAPSULE | Refills: 3 | Status: SHIPPED | OUTPATIENT
Start: 2021-01-01 | End: 2021-01-01 | Stop reason: SDUPTHER

## 2021-01-01 RX ORDER — CLONAZEPAM 0.5 MG/1
TABLET ORAL
Qty: 75 TABLET | Refills: 0 | Status: SHIPPED | OUTPATIENT
Start: 2021-01-01 | End: 2022-01-01 | Stop reason: SDUPTHER

## 2021-01-01 RX ORDER — DEXTROMETHORPHAN HYDROBROMIDE AND QUINIDINE SULFATE 20; 10 MG/1; MG/1
1 CAPSULE, GELATIN COATED ORAL EVERY 12 HOURS
Qty: 56 CAPSULE | Refills: 3 | Status: SHIPPED | OUTPATIENT
Start: 2021-01-01 | End: 2022-01-01 | Stop reason: SDUPTHER

## 2021-01-01 RX ORDER — RISPERIDONE 2 MG/1
TABLET ORAL
Qty: 60 TABLET | Refills: 3 | Status: SHIPPED | OUTPATIENT
Start: 2021-01-01 | End: 2022-01-01 | Stop reason: SDUPTHER

## 2021-01-01 RX ORDER — RISPERIDONE 2 MG/1
TABLET ORAL
Qty: 60 TABLET | Refills: 3 | Status: SHIPPED | OUTPATIENT
Start: 2021-01-01 | End: 2021-01-01 | Stop reason: SDUPTHER

## 2021-01-01 RX ORDER — CLONAZEPAM 0.5 MG/1
TABLET ORAL
Qty: 75 TABLET | Refills: 0 | OUTPATIENT
Start: 2021-01-01

## 2021-01-01 RX ORDER — MIRTAZAPINE 30 MG/1
30 TABLET, FILM COATED ORAL
Qty: 30 TABLET | Refills: 3 | Status: SHIPPED | OUTPATIENT
Start: 2021-01-01 | End: 2021-01-01 | Stop reason: SDUPTHER

## 2021-01-01 RX ORDER — MIRTAZAPINE 30 MG/1
30 TABLET, FILM COATED ORAL
Qty: 30 TABLET | Refills: 3 | Status: SHIPPED | OUTPATIENT
Start: 2021-01-01 | End: 2022-01-01 | Stop reason: SDUPTHER

## 2021-01-07 DIAGNOSIS — F63.81 INTERMITTENT EXPLOSIVE DISORDER IN ADULT: ICD-10-CM

## 2021-01-07 RX ORDER — CLONAZEPAM 0.5 MG/1
TABLET ORAL
Qty: 75 TABLET | Refills: 0 | Status: SHIPPED | OUTPATIENT
Start: 2021-01-07 | End: 2021-01-21 | Stop reason: SDUPTHER

## 2021-01-21 ENCOUNTER — TELEMEDICINE (OUTPATIENT)
Dept: PSYCHIATRY | Facility: CLINIC | Age: 41
End: 2021-01-21

## 2021-01-21 DIAGNOSIS — F79 ACQUIRED INTELLECTUAL DISABILITY: ICD-10-CM

## 2021-01-21 DIAGNOSIS — F63.81 INTERMITTENT EXPLOSIVE DISORDER IN ADULT: Primary | ICD-10-CM

## 2021-01-21 DIAGNOSIS — Z87.828 HISTORY OF TRAUMATIC HEAD INJURY: ICD-10-CM

## 2021-01-21 DIAGNOSIS — F48.2 PBA (PSEUDOBULBAR AFFECT): ICD-10-CM

## 2021-01-21 PROCEDURE — 99214 OFFICE O/P EST MOD 30 MIN: CPT | Performed by: NURSE PRACTITIONER

## 2021-01-21 RX ORDER — DEXTROMETHORPHAN HYDROBROMIDE AND QUINIDINE SULFATE 20; 10 MG/1; MG/1
1 CAPSULE, GELATIN COATED ORAL EVERY 12 HOURS
Qty: 56 CAPSULE | Refills: 3 | Status: SHIPPED | OUTPATIENT
Start: 2021-01-21 | End: 2021-01-01 | Stop reason: SDUPTHER

## 2021-01-21 RX ORDER — CLONAZEPAM 0.5 MG/1
TABLET ORAL
Qty: 75 TABLET | Refills: 0 | Status: SHIPPED | OUTPATIENT
Start: 2021-01-21 | End: 2021-02-17 | Stop reason: SDUPTHER

## 2021-01-21 RX ORDER — MIRTAZAPINE 30 MG/1
30 TABLET, FILM COATED ORAL
Qty: 30 TABLET | Refills: 3 | Status: SHIPPED | OUTPATIENT
Start: 2021-01-21 | End: 2021-01-01 | Stop reason: SDUPTHER

## 2021-01-21 RX ORDER — RISPERIDONE 2 MG/1
TABLET ORAL
Qty: 60 TABLET | Refills: 3 | Status: SHIPPED | OUTPATIENT
Start: 2021-01-21 | End: 2021-01-01 | Stop reason: SDUPTHER

## 2021-01-21 NOTE — PROGRESS NOTES
"You have chosen to receive care through a telephone visit. Do you consent to use a telephone visit for your medical care today? Yes    This provider is located at [facility] at [address] [place] [address]. The provider identified herself as well as her credentials.   The Patient is at home using his phone because problems with video connection and because of ID his case is discussed with Medical Coordinator Karely at [facility]. The patient's condition being diagnosed/treated is appropriate for telemedicine. The patient gave consent to be seen remotely, and when consent is given they understand that the consent allows for patient identifiable information to be sent to a third party as needed.   They may refuse to be seen remotely at any time. The electronic data is encrypted and password protected, and the patient has been advised of the potential risks to privacy not withstanding such measures    Subjective   Murali Coyle is a 40 y.o. male is being interviewed via Telephone as recommended per CDC guidelines associated with Corona Pandemic.    Interactive complexity related to inability to communicate and must be accompanied by a provider.     Chief Complaint: Follow-up for agitation    History of Present Illness  Medical Coordinator (MC)  reports that the patient has been doing really well, she states that she feels like he has done better with the decreased dose of the clonazepam during the afternoon, he drools still but not as bad.  He has been taking all of his medications as prescribed, no reports of any significant side effects.  He has been exposed to COVID but not positive test or virus symptoms.  No reports of any acting out behaviors, continues to yell \"HEY\" at times.  Denies any symptoms of anxiety or depression.  He has been eating well with possible slight weight gain.  He has been sleeping well with no reports of wandering or NM, he does take naps during the " day.  Denies any recent health issues.  Denies any AV hallucinations, periods of laughing out for no reason but this is baseline for him as part of TBI; denies any self harming behaviors.      The following portions of the patient's history were reviewed and updated as appropriate: allergies, current medications, past family history, past medical history, past social history, past surgical history and problem list.    Review of Systems   Constitutional: Negative for appetite change, chills, diaphoresis, fatigue, fever and unexpected weight change.   HENT: Negative for hearing loss, sore throat, trouble swallowing and voice change.    Eyes: Negative for photophobia and visual disturbance.   Respiratory: Negative for cough, chest tightness and shortness of breath.    Cardiovascular: Negative for chest pain and palpitations.   Gastrointestinal: Negative for abdominal pain, constipation, nausea and vomiting.   Endocrine: Negative for cold intolerance and heat intolerance.   Genitourinary: Negative for dysuria and frequency.   Musculoskeletal: Negative for arthralgias, back pain, joint swelling and neck stiffness.   Skin: Negative for color change and wound.   Allergic/Immunologic: Negative for environmental allergies and immunocompromised state.   Neurological: Negative for dizziness, tremors, seizures, syncope, weakness, light-headedness and headaches.   Hematological: Negative for adenopathy. Does not bruise/bleed easily.     Objective   Physical Exam   Neurological:   Unable to communicate      There were no vitals taken for this visit.      Medication List:   Current Outpatient Medications   Medication Sig Dispense Refill   • ammonium lactate (AMLACTIN) 12 % cream APPLY TO AFFECTED AREAS 2 TIMES A DAY AS NEEDED  5   • cetirizine (zyrTEC) 10 MG tablet Take  by mouth.     • clonazePAM (KlonoPIN) 0.5 MG tablet Take one (1) tablet by mouth every morning and evening, and take 1/2 tablet every afternoon at 2pm 75 tablet 0    • dextromethorphan-quinidine (Nuedexta) 20-10 MG capsule capsule Take 1 capsule by mouth Every 12 (Twelve) Hours. 56 capsule 3   • DIAPER RASH 40 % ointment APPLY TO BOTTOM THREE TIMES DAILY AS NEEDED  5   • fluticasone (FLONASE) 50 MCG/ACT nasal spray SPRAY 2 SPRAYS IN EACH NOSTRIL ONCE DAILY FOR 14 DAYS  0   • MAPAP 325 MG tablet TAKE 2 TABLETS 2 TIMES A DAY AS NEEDED FOR HEADACHE,PAIN OR TEMP OVER 100.4 MAX 30 TABLETS FOR 30 DAYS  2   • mirtazapine (REMERON) 30 MG tablet Take 1 tablet by mouth every night at bedtime. 30 tablet 3   • omeprazole (priLOSEC) 40 MG capsule Take 1 capsule by mouth 2 (Two) Times a Day Before Meals. Take a half hour before breakfast 60 capsule 11   • pantoprazole (PROTONIX) 20 MG EC tablet Take 20 mg by mouth Daily.     • polyethylene glycol (MIRALAX) powder MIX 17GM IN 8OZ WATER OR JUICE AND DRINK DAILY  5   • promethazine (PHENERGAN) 25 MG tablet Take 1 tablet by mouth As Needed.     • risperiDONE (risperDAL) 2 MG tablet TAKE ONE TABLET EVERY MORNING AND AT 8:00PM 60 tablet 3   • Skin Oils (ALPHA ALTA SHOWER & BATH) oil   5   • vitamin D (ERGOCALCIFEROL) 96611 units capsule capsule TAKE ONE CAPSULE BY MOUTH ONCE EVERY WEEK  0     No current facility-administered medications for this visit.      Mental Status Exam:   Hygiene:   Unable to assess  Cooperation:  Guarded  Eye Contact:  unable to assess  Psychomotor Behavior:  unable to assess  Affect:  unable to assess  Hopelessness: Denies  Speech:  Limited  Thought Process:  blocked  Thought Content:  Unable to demonstrate  Suicidal:  No self injurous behavior reported  Homicidal:  No violent behavior toward others reported  Hallucinations:  No response noted to internal or external stimuli  Delusion:  Other Unable to assess  Memory:  Unable to evaluate  Orientation:  Person  Reliability:  poor  Insight:  None  Judgement:  Impaired  Impulse Control:  Impaired  Physical/Medical Issues:  No     Assessment/Plan   Problems Addressed this  Visit     None      Visit Diagnoses     Intermittent explosive disorder in adult    -  Primary    Relevant Medications    risperiDONE (risperDAL) 2 MG tablet    dextromethorphan-quinidine (Nuedexta) 20-10 MG capsule capsule    mirtazapine (REMERON) 30 MG tablet    clonazePAM (KlonoPIN) 0.5 MG tablet    PBA (pseudobulbar affect)        Relevant Medications    dextromethorphan-quinidine (Nuedexta) 20-10 MG capsule capsule    History of traumatic head injury        Acquired intellectual disability          Diagnoses       Codes Comments    Intermittent explosive disorder in adult    -  Primary ICD-10-CM: F63.81  ICD-9-CM: 312.34     PBA (pseudobulbar affect)     ICD-10-CM: F48.2  ICD-9-CM: 310.81     History of traumatic head injury     ICD-10-CM: Z87.828  ICD-9-CM: V15.59     Acquired intellectual disability     ICD-10-CM: F79  ICD-9-CM: 319         Clonazepam 0.5mg bid- take 1/2 tab at 2pm.      Discussed medication options. Continue mirtazapine for sleep;  continue risperidone for impulse control, and slightly decrease clonazepam for anxiety.  Continue nuedexta for mood/behaviors.  Reviewed the risks, benefits, and side effects of the medications; patient acknowledged and verbally consented.  Patient/Staff is agreeable to call the [facility] with any worsening of symptoms.  Patient is aware to call 911 or go to the nearest ER should begin having SI/HI.     Prognosis:  Guarded dependent on medication, follow up appointment and treatment plan compliance     Functionality: Poor.  Patient unable to communicate and requires assistance to complete ADLs.      Treatment plan completed on 10/27/2020    Return in 12 weeks

## 2021-02-01 ENCOUNTER — HOSPITAL ENCOUNTER (INPATIENT)
Dept: HOSPITAL 79 - ER1 | Age: 41
LOS: 4 days | Discharge: HOME | DRG: 177 | End: 2021-02-05
Attending: STUDENT IN AN ORGANIZED HEALTH CARE EDUCATION/TRAINING PROGRAM | Admitting: INTERNAL MEDICINE
Payer: COMMERCIAL

## 2021-02-01 VITALS — BODY MASS INDEX: 26.68 KG/M2 | WEIGHT: 170 LBS | HEIGHT: 67 IN

## 2021-02-01 DIAGNOSIS — J96.01: ICD-10-CM

## 2021-02-01 DIAGNOSIS — I48.0: ICD-10-CM

## 2021-02-01 DIAGNOSIS — E87.6: ICD-10-CM

## 2021-02-01 DIAGNOSIS — R13.10: ICD-10-CM

## 2021-02-01 DIAGNOSIS — R16.1: ICD-10-CM

## 2021-02-01 DIAGNOSIS — J12.82: ICD-10-CM

## 2021-02-01 DIAGNOSIS — T17.928A: ICD-10-CM

## 2021-02-01 DIAGNOSIS — U07.1: Primary | ICD-10-CM

## 2021-02-01 DIAGNOSIS — Z87.820: ICD-10-CM

## 2021-02-01 DIAGNOSIS — Z79.899: ICD-10-CM

## 2021-02-01 DIAGNOSIS — F79: ICD-10-CM

## 2021-02-01 LAB
BUN/CREATININE RATIO: 11 (ref 0–10)
HGB BLD-MCNC: 12.4 GM/DL (ref 14–17.5)
RED BLOOD COUNT: 4.03 M/UL (ref 4.2–5.5)
WHITE BLOOD COUNT: 6.2 K/UL (ref 4.5–11)

## 2021-02-01 PROCEDURE — U0002 COVID-19 LAB TEST NON-CDC: HCPCS

## 2021-02-01 PROCEDURE — XW033E5 INTRODUCTION OF REMDESIVIR ANTI-INFECTIVE INTO PERIPHERAL VEIN, PERCUTANEOUS APPROACH, NEW TECHNOLOGY GROUP 5: ICD-10-PCS | Performed by: EMERGENCY MEDICINE

## 2021-02-01 PROCEDURE — 8E0ZXY6 ISOLATION: ICD-10-PCS | Performed by: EMERGENCY MEDICINE

## 2021-02-02 LAB
BUN/CREATININE RATIO: 13 (ref 0–10)
HGB BLD-MCNC: 11.2 GM/DL (ref 14–17.5)
RED BLOOD COUNT: 3.7 M/UL (ref 4.2–5.5)
WHITE BLOOD COUNT: 9.8 K/UL (ref 4.5–11)

## 2021-02-02 PROCEDURE — XW13325 TRANSFUSION OF CONVALESCENT PLASMA (NONAUTOLOGOUS) INTO PERIPHERAL VEIN, PERCUTANEOUS APPROACH, NEW TECHNOLOGY GROUP 5: ICD-10-PCS | Performed by: EMERGENCY MEDICINE

## 2021-02-03 LAB
BUN/CREATININE RATIO: 24 (ref 0–10)
HGB BLD-MCNC: 11.7 GM/DL (ref 14–17.5)
RED BLOOD COUNT: 3.92 M/UL (ref 4.2–5.5)
WHITE BLOOD COUNT: 7.6 K/UL (ref 4.5–11)

## 2021-02-03 PROCEDURE — B24BZZZ ULTRASONOGRAPHY OF HEART WITH AORTA: ICD-10-PCS | Performed by: INTERNAL MEDICINE

## 2021-02-05 LAB — BUN/CREATININE RATIO: 27 (ref 0–10)

## 2021-02-17 DIAGNOSIS — F63.81 INTERMITTENT EXPLOSIVE DISORDER IN ADULT: ICD-10-CM

## 2021-02-17 RX ORDER — MIRTAZAPINE 30 MG/1
30 TABLET, FILM COATED ORAL
Qty: 30 TABLET | Refills: 3 | OUTPATIENT
Start: 2021-02-17

## 2021-02-17 RX ORDER — RISPERIDONE 2 MG/1
TABLET ORAL
Qty: 60 TABLET | Refills: 3 | OUTPATIENT
Start: 2021-02-17

## 2021-02-17 RX ORDER — CLONAZEPAM 0.5 MG/1
TABLET ORAL
Qty: 75 TABLET | Refills: 0 | Status: SHIPPED | OUTPATIENT
Start: 2021-02-17 | End: 2021-01-01 | Stop reason: SDUPTHER

## 2021-03-31 ENCOUNTER — HOSPITAL ENCOUNTER (OUTPATIENT)
Dept: HOSPITAL 79 - KOH-I | Age: 41
End: 2021-03-31
Attending: NURSE PRACTITIONER
Payer: COMMERCIAL

## 2021-03-31 DIAGNOSIS — M54.5: Primary | ICD-10-CM

## 2021-03-31 DIAGNOSIS — M40.205: ICD-10-CM

## 2021-03-31 DIAGNOSIS — M41.86: ICD-10-CM

## 2021-04-06 ENCOUNTER — HOSPITAL ENCOUNTER (OUTPATIENT)
Dept: HOSPITAL 79 - KOH-I | Age: 41
End: 2021-04-06
Attending: NURSE PRACTITIONER
Payer: COMMERCIAL

## 2021-04-06 DIAGNOSIS — M48.02: ICD-10-CM

## 2021-04-06 DIAGNOSIS — M54.5: Primary | ICD-10-CM

## 2021-04-06 DIAGNOSIS — M25.78: ICD-10-CM

## 2021-04-06 DIAGNOSIS — M50.222: ICD-10-CM

## 2021-04-06 DIAGNOSIS — M51.25: ICD-10-CM

## 2021-04-06 DIAGNOSIS — M48.04: ICD-10-CM

## 2021-04-06 DIAGNOSIS — M50.322: ICD-10-CM

## 2021-04-06 DIAGNOSIS — M48.05: ICD-10-CM

## 2021-04-06 DIAGNOSIS — M51.24: ICD-10-CM

## 2021-04-14 NOTE — TELEPHONE ENCOUNTER
Karely from [facility] called and said that Murali needed a refill on his Klonopin 0.5mg  His next appt with you is tomorrow 04/15/2021. Thank you!

## 2021-04-15 NOTE — PROGRESS NOTES
You have chosen to receive care through a telephone visit. Do you consent to use a telephone visit for your medical care today? Yes    This provider is located at [facility] at [address] [place] [address]. The provider identified herself as well as her credentials.   The Patient is at home using his phone because problems with video connection and because of ID his case is discussed with Medical Coordinator Karely at [facility]. The patient's condition being diagnosed/treated is appropriate for telemedicine. The patient gave consent to be seen remotely, and when consent is given they understand that the consent allows for patient identifiable information to be sent to a third party as needed.   They may refuse to be seen remotely at any time. The electronic data is encrypted and password protected, and the patient has been advised of the potential risks to privacy not withstanding such measures    Subjective   Murali Coyle is a 40 y.o. male is being interviewed via Telephone as recommended per CDC guidelines associated with Corona Pandemic.    Interactive complexity related to inability to communicate and must be accompanied by a provider.     Chief Complaint: Follow-up for agitation    History of Present Illness  Medical Coordinator (MC)  reports that the patient has been doing pretty well, staff reports that he has been holding food in his mouth more recently which she believes is related to decreased stimuli and with his brain injury he requires extra prompts.  She states that overall he is doing well but required an MRI for his back- patient needed a one time dose of diazepam to assist with is anxiety.  No problems with acting out during the day but still takes naps which sometimes effects his sleep at night.  He is getting about 10 hours per night with no NM reported.  He has been eating ok but it was changed to puree which could be contributing to the holding of food  in his mouth.  His weight has remained consistent.  Denies any other health issues.  Denies any new stressors but did get transferred to a different house.  Denies any response to internal or external stimuli, denies any self-harming behaviors.     The following portions of the patient's history were reviewed and updated as appropriate: allergies, current medications, past family history, past medical history, past social history, past surgical history and problem list.    Review of Systems   Constitutional: Negative for appetite change, chills, diaphoresis, fatigue, fever and unexpected weight change.   HENT: Negative for hearing loss, sore throat, trouble swallowing and voice change.    Eyes: Negative for photophobia and visual disturbance.   Respiratory: Negative for cough, chest tightness and shortness of breath.    Cardiovascular: Negative for chest pain and palpitations.   Gastrointestinal: Negative for abdominal pain, constipation, nausea and vomiting.   Endocrine: Negative for cold intolerance and heat intolerance.   Genitourinary: Negative for dysuria and frequency.   Musculoskeletal: Negative for arthralgias, back pain, joint swelling and neck stiffness.   Skin: Negative for color change and wound.   Allergic/Immunologic: Negative for environmental allergies and immunocompromised state.   Neurological: Negative for dizziness, tremors, seizures, syncope, weakness, light-headedness and headaches.   Hematological: Negative for adenopathy. Does not bruise/bleed easily.     Objective   Physical Exam   Neurological:   Unable to communicate      There were no vitals taken for this visit.      Medication List:   Current Outpatient Medications   Medication Sig Dispense Refill   • ammonium lactate (AMLACTIN) 12 % cream APPLY TO AFFECTED AREAS 2 TIMES A DAY AS NEEDED  5   • cetirizine (zyrTEC) 10 MG tablet Take  by mouth.     • clonazePAM (KlonoPIN) 0.5 MG tablet Take one (1) tablet by mouth every morning and  evening, and take 1/2 tablet every afternoon at 2pm 75 tablet 0   • dextromethorphan-quinidine (Nuedexta) 20-10 MG capsule capsule Take 1 capsule by mouth Every 12 (Twelve) Hours. 56 capsule 3   • DIAPER RASH 40 % ointment APPLY TO BOTTOM THREE TIMES DAILY AS NEEDED  5   • fluticasone (FLONASE) 50 MCG/ACT nasal spray SPRAY 2 SPRAYS IN EACH NOSTRIL ONCE DAILY FOR 14 DAYS  0   • MAPAP 325 MG tablet TAKE 2 TABLETS 2 TIMES A DAY AS NEEDED FOR HEADACHE,PAIN OR TEMP OVER 100.4 MAX 30 TABLETS FOR 30 DAYS  2   • mirtazapine (REMERON) 30 MG tablet Take 1 tablet by mouth every night at bedtime. 30 tablet 3   • omeprazole (priLOSEC) 40 MG capsule Take 1 capsule by mouth 2 (Two) Times a Day Before Meals. Take a half hour before breakfast 60 capsule 11   • pantoprazole (PROTONIX) 20 MG EC tablet Take 20 mg by mouth Daily.     • polyethylene glycol (MIRALAX) powder MIX 17GM IN 8OZ WATER OR JUICE AND DRINK DAILY  5   • promethazine (PHENERGAN) 25 MG tablet Take 1 tablet by mouth As Needed.     • risperiDONE (risperDAL) 2 MG tablet TAKE ONE TABLET EVERY MORNING AND AT 8:00PM 60 tablet 3   • Skin Oils (ALPHA ALTA SHOWER & BATH) oil   5   • vitamin D (ERGOCALCIFEROL) 70857 units capsule capsule TAKE ONE CAPSULE BY MOUTH ONCE EVERY WEEK  0     No current facility-administered medications for this visit.     Mental Status Exam:   Hygiene:   Unable to assess  Cooperation:  Guarded  Eye Contact:  unable to assess  Psychomotor Behavior:  unable to assess  Affect:  unable to assess  Hopelessness: Denies  Speech:  Limited  Thought Process:  blocked  Thought Content:  Unable to demonstrate  Suicidal:  No self injurous behavior reported  Homicidal:  No violent behavior toward others reported  Hallucinations:  No response noted to internal or external stimuli  Delusion:  Other Unable to assess  Memory:  Unable to evaluate  Orientation:  Person  Reliability:  poor  Insight:  None  Judgement:  Impaired  Impulse Control:   Impaired  Physical/Medical Issues:  No     Assessment/Plan   Problems Addressed this Visit     None      Diagnoses    None.       Clonazepam 0.5mg bid- take 1/2 tab at 2pm.      Discussed medication options. Continue mirtazapine for sleep;  continue risperidone for impulse control, and slightly decrease clonazepam for anxiety.  Continue nuedexta for mood/behaviors.  Reviewed the risks, benefits, and side effects of the medications; patient acknowledged and verbally consented.  Patient/Staff is agreeable to call the [facility] with any worsening of symptoms.  Patient is aware to call 911 or go to the nearest ER should begin having SI/HI.     Prognosis:  Guarded dependent on medication, follow up appointment and treatment plan compliance     Functionality: Poor.  Patient unable to communicate and requires assistance to complete ADLs.      Treatment plan completed on 10/27/2020    Return in 12 weeks

## 2021-06-28 NOTE — TELEPHONE ENCOUNTER
[facility] usually request their residents a couple of weeks early so that the pharmacy can have them prepared before they run out, but I can let Karely know to request them next week.

## 2021-07-13 NOTE — PROGRESS NOTES
"You have chosen to receive care through a telephone visit. Do you consent to use a telephone visit for your medical care today? Yes    This provider is located at [facility] at [address] [place] [address]. The provider identified herself as well as her credentials.   The Patient is at home using his phone because problems with video connection and because of ID his case is discussed with Medical Coordinator Karely at [facility]. The patient's condition being diagnosed/treated is appropriate for telemedicine. The patient gave consent to be seen remotely, and when consent is given they understand that the consent allows for patient identifiable information to be sent to a third party as needed.   They may refuse to be seen remotely at any time. The electronic data is encrypted and password protected, and the patient has been advised of the potential risks to privacy not withstanding such measures    Subjective   Murali Coyle is a 41 y.o. male is being interviewed via Telephone as recommended per CDC guidelines associated with Corona Pandemic.    Interactive complexity related to inability to communicate and must be accompanied by a provider.     Chief Complaint: Follow-up for agitation    History of Present Illness  Medical Coordinator (MC)  reports that the patient has been doing really well, no new issues since his last visit.  He had dental work completed yesterday and is a little sluggish today.    He continues to take his medications as prescribed with no issues with taking them.  He seems to be doing better with slight decrease in clonazepam, he still tends to \"wander some\" but MC believes its associated with TBI.  No reports of depression or anxiety, but does have moments when he wanders into space.  He has been sleeping well with no issues, he is not a morning person, he is getting about 8-10 hours per night with no NM reported.  He has been eating \"fine\" but has had " to have a pureed diet which is up for evaluation, he has stable weight.  Denies any new health issues; has back issues with scoliosis and herniated discs.  Denies any response to internal or external stimuli.  Denies any self-harming behaviors. No substance use.       The following portions of the patient's history were reviewed and updated as appropriate: allergies, current medications, past family history, past medical history, past social history, past surgical history and problem list.    Review of Systems   Musculoskeletal: Positive for gait problem.     Objective   Physical Exam  There were no vitals taken for this visit.      Medication List:   Current Outpatient Medications   Medication Sig Dispense Refill   • ammonium lactate (AMLACTIN) 12 % cream APPLY TO AFFECTED AREAS 2 TIMES A DAY AS NEEDED  5   • cetirizine (zyrTEC) 10 MG tablet Take  by mouth.     • clonazePAM (KlonoPIN) 0.5 MG tablet Take one (1) tablet by mouth every morning and evening, and take 1/2 tablet every afternoon at 2pm 75 tablet 0   • dextromethorphan-quinidine (Nuedexta) 20-10 MG capsule capsule Take 1 capsule by mouth Every 12 (Twelve) Hours. 56 capsule 3   • DIAPER RASH 40 % ointment APPLY TO BOTTOM THREE TIMES DAILY AS NEEDED  5   • fluticasone (FLONASE) 50 MCG/ACT nasal spray SPRAY 2 SPRAYS IN EACH NOSTRIL ONCE DAILY FOR 14 DAYS  0   • MAPAP 325 MG tablet TAKE 2 TABLETS 2 TIMES A DAY AS NEEDED FOR HEADACHE,PAIN OR TEMP OVER 100.4 MAX 30 TABLETS FOR 30 DAYS  2   • mirtazapine (REMERON) 30 MG tablet Take 1 tablet by mouth every night at bedtime. 30 tablet 3   • omeprazole (priLOSEC) 40 MG capsule Take 1 capsule by mouth 2 (Two) Times a Day Before Meals. Take a half hour before breakfast 60 capsule 11   • pantoprazole (PROTONIX) 20 MG EC tablet Take 20 mg by mouth Daily.     • polyethylene glycol (MIRALAX) powder MIX 17GM IN 8OZ WATER OR JUICE AND DRINK DAILY  5   • promethazine (PHENERGAN) 25 MG tablet Take 1 tablet by mouth As Needed.      • risperiDONE (risperDAL) 2 MG tablet TAKE ONE TABLET EVERY MORNING AND AT 8:00PM 60 tablet 3   • Skin Oils (ALPHA ALTA SHOWER & BATH) oil   5   • vitamin D (ERGOCALCIFEROL) 21466 units capsule capsule TAKE ONE CAPSULE BY MOUTH ONCE EVERY WEEK  0     No current facility-administered medications for this visit.     Mental Status Exam:   Hygiene:   Unable to assess  Cooperation:  Guarded  Eye Contact:  unable to assess  Psychomotor Behavior:  unable to assess  Affect:  unable to assess  Hopelessness: Denies  Speech:  Limited  Thought Process:  blocked  Thought Content:  Unable to demonstrate  Suicidal:  No self injurous behavior reported  Homicidal:  No violent behavior toward others reported  Hallucinations:  No response noted to internal or external stimuli  Delusion:  Other Unable to assess  Memory:  Unable to evaluate  Orientation:  Person  Reliability:  poor  Insight:  None  Judgement:  Impaired  Impulse Control:  Impaired  Physical/Medical Issues:  No     Assessment/Plan   Problems Addressed this Visit     None      Visit Diagnoses     Intermittent explosive disorder in adult    -  Primary    Relevant Medications    clonazePAM (KlonoPIN) 0.5 MG tablet    dextromethorphan-quinidine (Nuedexta) 20-10 MG capsule capsule    mirtazapine (REMERON) 30 MG tablet    risperiDONE (risperDAL) 2 MG tablet    PBA (pseudobulbar affect)        Relevant Medications    dextromethorphan-quinidine (Nuedexta) 20-10 MG capsule capsule    Acquired intellectual disability        History of traumatic head injury          Diagnoses       Codes Comments    Intermittent explosive disorder in adult    -  Primary ICD-10-CM: F63.81  ICD-9-CM: 312.34     PBA (pseudobulbar affect)     ICD-10-CM: F48.2  ICD-9-CM: 310.81     Acquired intellectual disability     ICD-10-CM: F79  ICD-9-CM: 319     History of traumatic head injury     ICD-10-CM: Z87.828  ICD-9-CM: V15.59         Clonazepam 0.5mg bid- take 1/2 tab at 2pm.      Discussed medication  options. Continue mirtazapine for sleep;  continue risperidone for impulse control, and clonazepam for anxiety.  Continue nuedexta for mood/behaviors.  Reviewed the risks, benefits, and side effects of the medications; patient acknowledged and verbally consented.  Patient/Staff is agreeable to call the [facility] with any worsening of symptoms.  Patient is aware to call 911 or go to the nearest ER should begin having SI/HI. Medical decision making reviewed with no changes in medications.     Prognosis:  Guarded dependent on medication, follow up appointment and treatment plan compliance     Functionality: Poor.  Patient unable to communicate and requires assistance to complete ADLs.      Treatment plan completed on 10/27/2020    Return in 12 weeks     This visit has been rescheduled as a phone visit to comply with patient safety concerns in accordance with CDC recommendations. Total time of discussion was 10 minutes.

## 2021-09-28 NOTE — PROGRESS NOTES
"You have chosen to receive care through a telephone visit. Do you consent to use a telephone visit for your medical care today? Yes    This provider is located at [facility] at [address] [place] [address]. The provider identified herself as well as her credentials.   The Patient is at home using his phone because problems with video connection and because of ID his case is discussed with Medical Coordinator Karely at [facility]. The patient's condition being diagnosed/treated is appropriate for telemedicine. The patient gave consent to be seen remotely, and when consent is given they understand that the consent allows for patient identifiable information to be sent to a third party as needed.   They may refuse to be seen remotely at any time. The electronic data is encrypted and password protected, and the patient has been advised of the potential risks to privacy not withstanding such measures    Subjective   Murali Coyle is a 41 y.o. male is being interviewed via Telephone as recommended per CDC guidelines associated with Corona Pandemic.    Interactive complexity related to inability to communicate and must be accompanied by a provider.     Chief Complaint: Follow-up for agitation    History of Present Illness  Medical Coordinator (MC) reports that he has been doing really well, he has been getting behavioral support to help increase his social skills because he has been verbally aggressive and getting loud- he has been getting out to visit houses to avoid same situations but she feels that it depends on his mood as well.  He continues to take the medications as prescribed with no SE or problems reported.  He has been \"yelling\" and drawing his fist back with the new staff but no real concerns, MA rates his behaviors about 3/10 with 10 being the worse.  He has been sleeping well, he has been sleeping as long as he is allowed, he is getting about 12 hours of sleep per night " with no NM or wandering.  Appetite has been good with stable weight.  He has not had any health issues with no COVID virus.  He has had no acting out or acute stressors per staff, he has been enjoying his television.  Denies any response to internal or external stimuli, no self-harming behaviors.  No substance abuse reported.      The following portions of the patient's history were reviewed and updated as appropriate: allergies, current medications, past family history, past medical history, past social history, past surgical history and problem list.    Review of Systems   Musculoskeletal: Positive for gait problem.   Psychiatric/Behavioral: Positive for behavioral problems.     Objective   Physical Exam   Constitutional:   Patient is nonverbal     There were no vitals taken for this visit.      Medication List:   Current Outpatient Medications   Medication Sig Dispense Refill   • ammonium lactate (AMLACTIN) 12 % cream APPLY TO AFFECTED AREAS 2 TIMES A DAY AS NEEDED  5   • cetirizine (zyrTEC) 10 MG tablet Take  by mouth.     • clonazePAM (KlonoPIN) 0.5 MG tablet Take one (1) tablet by mouth every morning and evening, and take 1/2 tablet every afternoon at 2pm 75 tablet 0   • dextromethorphan-quinidine (Nuedexta) 20-10 MG capsule capsule Take 1 capsule by mouth Every 12 (Twelve) Hours. 56 capsule 3   • DIAPER RASH 40 % ointment APPLY TO BOTTOM THREE TIMES DAILY AS NEEDED  5   • fluticasone (FLONASE) 50 MCG/ACT nasal spray SPRAY 2 SPRAYS IN EACH NOSTRIL ONCE DAILY FOR 14 DAYS  0   • MAPAP 325 MG tablet TAKE 2 TABLETS 2 TIMES A DAY AS NEEDED FOR HEADACHE,PAIN OR TEMP OVER 100.4 MAX 30 TABLETS FOR 30 DAYS  2   • mirtazapine (REMERON) 30 MG tablet Take 1 tablet by mouth every night at bedtime. 30 tablet 3   • omeprazole (priLOSEC) 40 MG capsule Take 1 capsule by mouth 2 (Two) Times a Day Before Meals. Take a half hour before breakfast 60 capsule 11   • pantoprazole (PROTONIX) 20 MG EC tablet Take 20 mg by mouth Daily.      • polyethylene glycol (MIRALAX) powder MIX 17GM IN 8OZ WATER OR JUICE AND DRINK DAILY  5   • promethazine (PHENERGAN) 25 MG tablet Take 1 tablet by mouth As Needed.     • risperiDONE (risperDAL) 2 MG tablet TAKE ONE TABLET EVERY MORNING AND AT 8:00PM 60 tablet 3   • Skin Oils (ALPHA ALTA SHOWER & BATH) oil   5   • vitamin D (ERGOCALCIFEROL) 13357 units capsule capsule TAKE ONE CAPSULE BY MOUTH ONCE EVERY WEEK  0     No current facility-administered medications for this visit.     Mental Status Exam:   Hygiene:   Unable to assess  Cooperation:  Guarded  Eye Contact:  unable to assess  Psychomotor Behavior:  unable to assess  Affect:  unable to assess  Hopelessness: Denies  Speech:  Limited  Thought Process:  blocked  Thought Content:  Unable to demonstrate  Suicidal:  No self injurous behavior reported  Homicidal:  No violent behavior toward others reported  Hallucinations:  No response noted to internal or external stimuli  Delusion:  Other Unable to assess  Memory:  Unable to evaluate  Orientation:  Person  Reliability:  poor  Insight:  None  Judgement:  Impaired  Impulse Control:  Impaired  Physical/Medical Issues:  No     Assessment/Plan   Problems Addressed this Visit     None      Visit Diagnoses     Intermittent explosive disorder in adult    -  Primary    Relevant Medications    clonazePAM (KlonoPIN) 0.5 MG tablet    dextromethorphan-quinidine (Nuedexta) 20-10 MG capsule capsule    mirtazapine (REMERON) 30 MG tablet    risperiDONE (risperDAL) 2 MG tablet    PBA (pseudobulbar affect)        Relevant Medications    dextromethorphan-quinidine (Nuedexta) 20-10 MG capsule capsule    Acquired intellectual disability        History of traumatic head injury          Diagnoses       Codes Comments    Intermittent explosive disorder in adult    -  Primary ICD-10-CM: F63.81  ICD-9-CM: 312.34     PBA (pseudobulbar affect)     ICD-10-CM: F48.2  ICD-9-CM: 310.81     Acquired intellectual disability     ICD-10-CM:  F79  ICD-9-CM: 319     History of traumatic head injury     ICD-10-CM: Z87.828  ICD-9-CM: V15.59             Discussed medication options. Continue mirtazapine for sleep;  continue risperidone for impulse control, and clonazepam for anxiety.  Continue nuedexta for mood/behaviors.  Reviewed the risks, benefits, and side effects of the medications; patient acknowledged and verbally consented.  Patient/Staff is agreeable to call the [facility] with any worsening of symptoms.  Patient is aware to call 911 or go to the nearest ER should begin having SI/HI. Medical decision making reviewed with no changes in medications.     Prognosis:  Guarded dependent on medication, follow up appointment and treatment plan compliance     Functionality: Poor.  Patient unable to communicate and requires assistance to complete ADLs.      Treatment plan completed on 9/28/2021    Return in 12 weeks     This visit has been rescheduled as a phone visit to comply with patient safety concerns in accordance with CDC recommendations. Total time of discussion was 10 minutes.

## 2021-09-28 NOTE — TREATMENT PLAN
Multi-Disciplinary Problems (from Behavioral Health Treatment Plan)    Active Problems     Problem: Intermittent Explosive Disorder (PEDS)  Start Date: 09/28/21    Problem Details: The patient self scales this problem as 3.                         I have discussed and reviewed this treatment plan with the patient.  It has been printed for signatures.

## 2021-10-03 ENCOUNTER — HOSPITAL ENCOUNTER (EMERGENCY)
Dept: HOSPITAL 79 - ER1 | Age: 41
Discharge: HOME | End: 2021-10-03
Payer: COMMERCIAL

## 2021-10-03 DIAGNOSIS — S06.9X0A: Primary | ICD-10-CM
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
## 2021-10-03 DIAGNOSIS — F91.9: ICD-10-CM

## 2022-01-01 DIAGNOSIS — F63.81 INTERMITTENT EXPLOSIVE DISORDER IN ADULT: ICD-10-CM

## 2022-01-01 DIAGNOSIS — F48.2 PBA (PSEUDOBULBAR AFFECT): ICD-10-CM

## 2022-01-01 RX ORDER — CLONAZEPAM 0.5 MG/1
TABLET ORAL
Qty: 75 TABLET | Refills: 0 | Status: SHIPPED | OUTPATIENT
Start: 2022-01-01 | End: 2022-01-01 | Stop reason: SDUPTHER

## 2022-01-01 RX ORDER — DEXTROMETHORPHAN HYDROBROMIDE AND QUINIDINE SULFATE 20; 10 MG/1; MG/1
1 CAPSULE, GELATIN COATED ORAL EVERY 12 HOURS
Qty: 56 CAPSULE | Refills: 3 | Status: SHIPPED | OUTPATIENT
Start: 2022-01-01

## 2022-01-01 RX ORDER — RISPERIDONE 2 MG/1
TABLET ORAL
Qty: 60 TABLET | Refills: 3 | Status: SHIPPED | OUTPATIENT
Start: 2022-01-01

## 2022-01-01 RX ORDER — MIRTAZAPINE 30 MG/1
30 TABLET, FILM COATED ORAL
Qty: 30 TABLET | Refills: 3 | Status: SHIPPED | OUTPATIENT
Start: 2022-01-01

## 2022-01-01 RX ORDER — CLONAZEPAM 0.5 MG/1
TABLET ORAL
Qty: 75 TABLET | Refills: 0 | Status: SHIPPED | OUTPATIENT
Start: 2022-01-01

## 2022-02-23 ENCOUNTER — HOSPITAL ENCOUNTER (INPATIENT)
Dept: HOSPITAL 79 - ER1 | Age: 42
LOS: 12 days | DRG: 853 | End: 2022-03-07
Attending: INTERNAL MEDICINE | Admitting: INTERNAL MEDICINE
Payer: COMMERCIAL

## 2022-02-23 VITALS — WEIGHT: 179.46 LBS | BODY MASS INDEX: 26.58 KG/M2 | HEIGHT: 69 IN

## 2022-02-23 DIAGNOSIS — G93.1: ICD-10-CM

## 2022-02-23 DIAGNOSIS — Z51.5: ICD-10-CM

## 2022-02-23 DIAGNOSIS — Z86.16: ICD-10-CM

## 2022-02-23 DIAGNOSIS — T17.528A: ICD-10-CM

## 2022-02-23 DIAGNOSIS — B96.1: ICD-10-CM

## 2022-02-23 DIAGNOSIS — Z66: ICD-10-CM

## 2022-02-23 DIAGNOSIS — F99: ICD-10-CM
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
## 2022-02-23 DIAGNOSIS — M40.209: ICD-10-CM

## 2022-02-23 DIAGNOSIS — K21.9: ICD-10-CM

## 2022-02-23 DIAGNOSIS — R40.2433: ICD-10-CM

## 2022-02-23 DIAGNOSIS — E87.70: ICD-10-CM

## 2022-02-23 DIAGNOSIS — J96.01: ICD-10-CM

## 2022-02-23 DIAGNOSIS — Z87.820: ICD-10-CM

## 2022-02-23 DIAGNOSIS — E87.6: ICD-10-CM

## 2022-02-23 DIAGNOSIS — T38.0X5A: ICD-10-CM

## 2022-02-23 DIAGNOSIS — K72.00: ICD-10-CM

## 2022-02-23 DIAGNOSIS — I48.0: ICD-10-CM

## 2022-02-23 DIAGNOSIS — I21.A1: ICD-10-CM

## 2022-02-23 DIAGNOSIS — J93.9: ICD-10-CM

## 2022-02-23 DIAGNOSIS — R34: ICD-10-CM

## 2022-02-23 DIAGNOSIS — E87.4: ICD-10-CM

## 2022-02-23 DIAGNOSIS — A41.9: Primary | ICD-10-CM

## 2022-02-23 DIAGNOSIS — J96.02: ICD-10-CM

## 2022-02-23 DIAGNOSIS — Z79.899: ICD-10-CM

## 2022-02-23 DIAGNOSIS — J93.83: ICD-10-CM

## 2022-02-23 DIAGNOSIS — R13.10: ICD-10-CM

## 2022-02-23 DIAGNOSIS — I46.8: ICD-10-CM

## 2022-02-23 DIAGNOSIS — J69.0: ICD-10-CM

## 2022-02-23 DIAGNOSIS — J98.11: ICD-10-CM

## 2022-02-23 DIAGNOSIS — R73.9: ICD-10-CM

## 2022-02-23 DIAGNOSIS — R65.21: ICD-10-CM

## 2022-02-23 DIAGNOSIS — N39.0: ICD-10-CM

## 2022-02-23 DIAGNOSIS — R40.20: ICD-10-CM

## 2022-02-23 DIAGNOSIS — I95.9: ICD-10-CM

## 2022-02-23 LAB
BUN/CREATININE RATIO: 20 (ref 0–10)
BUN/CREATININE RATIO: 27 (ref 0–10)
HGB BLD-MCNC: 14.3 GM/DL (ref 14–17.5)
RED BLOOD COUNT: 4.58 M/UL (ref 4.2–5.5)
WHITE BLOOD COUNT: 21 K/UL (ref 4.5–11)

## 2022-02-23 PROCEDURE — 0BH17EZ INSERTION OF ENDOTRACHEAL AIRWAY INTO TRACHEA, VIA NATURAL OR ARTIFICIAL OPENING: ICD-10-PCS | Performed by: INTERNAL MEDICINE

## 2022-02-23 PROCEDURE — 0BC78ZZ EXTIRPATION OF MATTER FROM LEFT MAIN BRONCHUS, VIA NATURAL OR ARTIFICIAL OPENING ENDOSCOPIC: ICD-10-PCS | Performed by: INTERNAL MEDICINE

## 2022-02-23 PROCEDURE — 0BCL8ZZ EXTIRPATION OF MATTER FROM LEFT LUNG, VIA NATURAL OR ARTIFICIAL OPENING ENDOSCOPIC: ICD-10-PCS | Performed by: INTERNAL MEDICINE

## 2022-02-23 PROCEDURE — 5A1955Z RESPIRATORY VENTILATION, GREATER THAN 96 CONSECUTIVE HOURS: ICD-10-PCS | Performed by: INTERNAL MEDICINE

## 2022-02-23 PROCEDURE — C9113 INJ PANTOPRAZOLE SODIUM, VIA: HCPCS

## 2022-02-23 PROCEDURE — U0002 COVID-19 LAB TEST NON-CDC: HCPCS

## 2022-02-23 PROCEDURE — P9047 ALBUMIN (HUMAN), 25%, 50ML: HCPCS

## 2022-02-23 PROCEDURE — 0BC38ZZ EXTIRPATION OF MATTER FROM RIGHT MAIN BRONCHUS, VIA NATURAL OR ARTIFICIAL OPENING ENDOSCOPIC: ICD-10-PCS | Performed by: INTERNAL MEDICINE

## 2022-02-23 PROCEDURE — 3E03329 INTRODUCTION OF OTHER ANTI-INFECTIVE INTO PERIPHERAL VEIN, PERCUTANEOUS APPROACH: ICD-10-PCS | Performed by: INTERNAL MEDICINE

## 2022-02-23 PROCEDURE — A6212 FOAM DRG <=16 SQ IN W/BORDER: HCPCS

## 2022-02-24 LAB
BUN/CREATININE RATIO: 24 (ref 0–10)
HGB BLD-MCNC: 15.4 GM/DL (ref 14–17.5)
RED BLOOD COUNT: 4.98 M/UL (ref 4.2–5.5)
WHITE BLOOD COUNT: 19.1 K/UL (ref 4.5–11)

## 2022-02-24 PROCEDURE — B24BZZZ ULTRASONOGRAPHY OF HEART WITH AORTA: ICD-10-PCS | Performed by: INTERNAL MEDICINE

## 2022-02-24 PROCEDURE — 0W9930Z DRAINAGE OF RIGHT PLEURAL CAVITY WITH DRAINAGE DEVICE, PERCUTANEOUS APPROACH: ICD-10-PCS | Performed by: INTERNAL MEDICINE

## 2022-02-24 PROCEDURE — 3E0336Z INTRODUCTION OF NUTRITIONAL SUBSTANCE INTO PERIPHERAL VEIN, PERCUTANEOUS APPROACH: ICD-10-PCS | Performed by: INTERNAL MEDICINE

## 2022-02-25 LAB
BUN/CREATININE RATIO: 19 (ref 0–10)
BUN/CREATININE RATIO: 24 (ref 0–10)
HEP C VIRUS AB: <0.1 (ref 0–0.9)
HGB BLD-MCNC: 13.8 GM/DL (ref 14–17.5)
HIV-1 RNA BY PCR: <20
RED BLOOD COUNT: 4.48 M/UL (ref 4.2–5.5)
WHITE BLOOD COUNT: 14.8 K/UL (ref 4.5–11)

## 2022-02-25 PROCEDURE — 3E033XZ INTRODUCTION OF VASOPRESSOR INTO PERIPHERAL VEIN, PERCUTANEOUS APPROACH: ICD-10-PCS | Performed by: INTERNAL MEDICINE

## 2022-02-26 LAB
BUN/CREATININE RATIO: 25 (ref 0–10)
HGB BLD-MCNC: 11.4 GM/DL (ref 14–17.5)
RED BLOOD COUNT: 3.7 M/UL (ref 4.2–5.5)
WHITE BLOOD COUNT: 10.1 K/UL (ref 4.5–11)

## 2022-02-27 LAB
BUN/CREATININE RATIO: 19 (ref 0–10)
HGB BLD-MCNC: 11.5 GM/DL (ref 14–17.5)
RED BLOOD COUNT: 3.77 M/UL (ref 4.2–5.5)
WHITE BLOOD COUNT: 6.1 K/UL (ref 4.5–11)

## 2022-02-28 LAB
BUN/CREATININE RATIO: 15 (ref 0–10)
HGB BLD-MCNC: 10.9 GM/DL (ref 14–17.5)
RED BLOOD COUNT: 3.68 M/UL (ref 4.2–5.5)
WHITE BLOOD COUNT: 5.5 K/UL (ref 4.5–11)

## 2022-03-01 LAB
BUN/CREATININE RATIO: 38 (ref 0–10)
HGB BLD-MCNC: 12.7 GM/DL (ref 14–17.5)
RED BLOOD COUNT: 4.28 M/UL (ref 4.2–5.5)
WHITE BLOOD COUNT: 12.3 K/UL (ref 4.5–11)

## 2022-03-01 PROCEDURE — 4A00X4Z MEASUREMENT OF CENTRAL NERVOUS ELECTRICAL ACTIVITY, EXTERNAL APPROACH: ICD-10-PCS | Performed by: PSYCHIATRY & NEUROLOGY

## 2022-03-02 LAB
BUN/CREATININE RATIO: 45 (ref 0–10)
HGB BLD-MCNC: 13.4 GM/DL (ref 14–17.5)
RED BLOOD COUNT: 4.4 M/UL (ref 4.2–5.5)
WHITE BLOOD COUNT: 15 K/UL (ref 4.5–11)

## 2022-03-04 NOTE — NUR
3-4-22
PT STATUS DECLINING. LIKELY TO NOT MAKE IT THROUGH THE NIGHT. CALLED [facility]
SINCE THEY WERE CURRENTLY FOLLOWING THE PATIENT TO LET THEM KNOW PT'S CURRENT
CONDITION. [facility] REPRESENTATIVE STATED THEY WOULD CALL AND LET THE [facility] KNOW.
ASSUME [facility] WILL CONTACT US BACK WITH ANY NEW UPDATES.
HAYDER TALBERT RN

## 2022-03-06 LAB
BUN/CREATININE RATIO: 34 (ref 0–10)
HGB BLD-MCNC: 7.7 GM/DL (ref 14–17.5)
MECA (METHICILLIN RESIST GENE: DETECTED
RED BLOOD COUNT: 2.6 M/UL (ref 4.2–5.5)
STAPHYLOCOCCUS: DETECTED
WHITE BLOOD COUNT: 22.1 K/UL (ref 4.5–11)

## 2022-03-07 LAB
BUN/CREATININE RATIO: 32 (ref 0–10)
HGB BLD-MCNC: 7.1 GM/DL (ref 14–17.5)
RED BLOOD COUNT: 2.42 M/UL (ref 4.2–5.5)
WHITE BLOOD COUNT: 23.8 K/UL (ref 4.5–11)

## (undated) DEVICE — TUBING, SUCTION, 1/4" X 20', STRAIGHT: Brand: MEDLINE INDUSTRIES, INC.

## (undated) DEVICE — GOWN,REINF,POLY,ECL,PP SLV,XL: Brand: MEDLINE

## (undated) DEVICE — SINGLE PORT MANIFOLD: Brand: NEPTUNE 2

## (undated) DEVICE — FRCP BX RADJAW4 NDL 2.8 240CM LG OG BX40

## (undated) DEVICE — THE BITE BLOCK MAXI, LATEX FREE STRAP IS USED TO PROTECT THE ENDOSCOPE INSERTION TUBE FROM BEING BITTEN BY THE PATIENT.

## (undated) DEVICE — Device

## (undated) DEVICE — SYR LUERLOK 30CC

## (undated) DEVICE — SUCTION CANISTER, 1500CC, RIGID: Brand: DEROYAL

## (undated) DEVICE — Device: Brand: DEFENDO AIR/WATER/SUCTION AND BIOPSY VALVE